# Patient Record
Sex: FEMALE | Race: WHITE | NOT HISPANIC OR LATINO | Employment: FULL TIME | URBAN - METROPOLITAN AREA
[De-identification: names, ages, dates, MRNs, and addresses within clinical notes are randomized per-mention and may not be internally consistent; named-entity substitution may affect disease eponyms.]

---

## 2021-08-15 ENCOUNTER — HOSPITAL ENCOUNTER (OUTPATIENT)
Facility: HOSPITAL | Age: 46
Setting detail: OBSERVATION
Discharge: HOME | End: 2021-08-17
Attending: EMERGENCY MEDICINE | Admitting: HOSPITALIST

## 2021-08-15 ENCOUNTER — APPOINTMENT (OUTPATIENT)
Dept: CT IMAGING | Facility: HOSPITAL | Age: 46
End: 2021-08-15

## 2021-08-15 DIAGNOSIS — F19.10 POLYSUBSTANCE ABUSE (HCC): ICD-10-CM

## 2021-08-15 DIAGNOSIS — E11.65 UNCONTROLLED TYPE 2 DIABETES MELLITUS WITH HYPERGLYCEMIA (HCC): ICD-10-CM

## 2021-08-15 DIAGNOSIS — B86 SCABIES: ICD-10-CM

## 2021-08-15 DIAGNOSIS — R41.82 ALTERED MENTAL STATUS, UNSPECIFIED ALTERED MENTAL STATUS TYPE: ICD-10-CM

## 2021-08-15 DIAGNOSIS — R40.20 LOSS OF CONSCIOUSNESS (HCC): Primary | ICD-10-CM

## 2021-08-15 PROBLEM — E11.9 TYPE 2 DIABETES MELLITUS (HCC): Status: ACTIVE | Noted: 2021-08-15

## 2021-08-15 PROBLEM — F19.90 ILLICIT DRUG USE: Status: ACTIVE | Noted: 2021-08-15

## 2021-08-15 PROBLEM — I10 ESSENTIAL HYPERTENSION: Status: ACTIVE | Noted: 2021-08-15

## 2021-08-15 PROBLEM — E66.01 OBESITY, CLASS III, BMI 40-49.9 (MORBID OBESITY) (HCC): Status: ACTIVE | Noted: 2021-08-15

## 2021-08-15 PROBLEM — Z87.898 HISTORY OF SEIZURES: Status: ACTIVE | Noted: 2021-08-15

## 2021-08-15 LAB
ALBUMIN SERPL-MCNC: 3.5 G/DL (ref 3.5–5.2)
ALBUMIN/GLOB SERPL: 0.9 G/DL
ALP SERPL-CCNC: 84 U/L (ref 39–117)
ALT SERPL W P-5'-P-CCNC: 38 U/L (ref 1–33)
AMPHET+METHAMPHET UR QL: POSITIVE
ANION GAP SERPL CALCULATED.3IONS-SCNC: 9.3 MMOL/L (ref 5–15)
ARTERIAL PATENCY WRIST A: POSITIVE
AST SERPL-CCNC: 29 U/L (ref 1–32)
ATMOSPHERIC PRESS: 753 MMHG
B-OH-BUTYR SERPL-SCNC: 0.09 MMOL/L (ref 0.02–0.27)
BACTERIA UR QL AUTO: ABNORMAL /HPF
BARBITURATES UR QL SCN: NEGATIVE
BASE EXCESS BLDA CALC-SCNC: 2.2 MMOL/L (ref 0–2)
BDY SITE: ABNORMAL
BENZODIAZ UR QL SCN: NEGATIVE
BILIRUB SERPL-MCNC: 0.3 MG/DL (ref 0–1.2)
BILIRUB UR QL STRIP: NEGATIVE
BUN SERPL-MCNC: 21 MG/DL (ref 6–20)
BUN/CREAT SERPL: 17.1 (ref 7–25)
CALCIUM SPEC-SCNC: 9.1 MG/DL (ref 8.6–10.5)
CANNABINOIDS SERPL QL: NEGATIVE
CHLORIDE SERPL-SCNC: 100 MMOL/L (ref 98–107)
CLARITY UR: CLEAR
CO2 SERPL-SCNC: 27.7 MMOL/L (ref 22–29)
COCAINE UR QL: NEGATIVE
COLOR UR: YELLOW
CREAT SERPL-MCNC: 1.23 MG/DL (ref 0.57–1)
DEPRECATED RDW RBC AUTO: 40.1 FL (ref 37–54)
ERYTHROCYTE [DISTWIDTH] IN BLOOD BY AUTOMATED COUNT: 12.8 % (ref 12.3–15.4)
ETHANOL BLD-MCNC: <10 MG/DL (ref 0–10)
ETHANOL UR QL: <0.01 %
GFR SERPL CREATININE-BSD FRML MDRD: 48 ML/MIN/1.73
GLOBULIN UR ELPH-MCNC: 3.7 GM/DL
GLUCOSE BLDC GLUCOMTR-MCNC: 211 MG/DL (ref 70–130)
GLUCOSE BLDC GLUCOMTR-MCNC: 254 MG/DL (ref 70–130)
GLUCOSE BLDC GLUCOMTR-MCNC: 294 MG/DL (ref 70–130)
GLUCOSE BLDC GLUCOMTR-MCNC: 307 MG/DL (ref 70–130)
GLUCOSE BLDC GLUCOMTR-MCNC: 345 MG/DL (ref 70–130)
GLUCOSE SERPL-MCNC: 354 MG/DL (ref 65–99)
GLUCOSE UR STRIP-MCNC: ABNORMAL MG/DL
HBA1C MFR BLD: 11.68 % (ref 4.8–5.6)
HCG SERPL QL: NEGATIVE
HCO3 BLDA-SCNC: 26.5 MMOL/L (ref 22–28)
HCT VFR BLD AUTO: 38.6 % (ref 34–46.6)
HGB BLD-MCNC: 12.6 G/DL (ref 12–15.9)
HGB UR QL STRIP.AUTO: ABNORMAL
HOLD SPECIMEN: NORMAL
HYALINE CASTS UR QL AUTO: ABNORMAL /LPF
KETONES UR QL STRIP: NEGATIVE
LEUKOCYTE ESTERASE UR QL STRIP.AUTO: NEGATIVE
LYMPHOCYTES # BLD MANUAL: 1.38 10*3/MM3 (ref 0.7–3.1)
LYMPHOCYTES NFR BLD MANUAL: 13 % (ref 19.6–45.3)
LYMPHOCYTES NFR BLD MANUAL: 2 % (ref 5–12)
MCH RBC QN AUTO: 28.4 PG (ref 26.6–33)
MCHC RBC AUTO-ENTMCNC: 32.6 G/DL (ref 31.5–35.7)
MCV RBC AUTO: 86.9 FL (ref 79–97)
METHADONE UR QL SCN: NEGATIVE
MODALITY: ABNORMAL
MONOCYTES # BLD AUTO: 0.21 10*3/MM3 (ref 0.1–0.9)
NEUTROPHILS # BLD AUTO: 9.04 10*3/MM3 (ref 1.7–7)
NEUTROPHILS NFR BLD MANUAL: 85 % (ref 42.7–76)
NITRITE UR QL STRIP: NEGATIVE
OPIATES UR QL: NEGATIVE
OXYCODONE UR QL SCN: POSITIVE
PCO2 BLDA: 39.1 MM HG (ref 35–45)
PH BLDA: 7.44 PH UNITS (ref 7.35–7.45)
PH UR STRIP.AUTO: 5.5 [PH] (ref 5–8)
PLAT MORPH BLD: NORMAL
PLATELET # BLD AUTO: 215 10*3/MM3 (ref 140–450)
PMV BLD AUTO: 10.3 FL (ref 6–12)
PO2 BLDA: 81.7 MM HG (ref 80–100)
POLYCHROMASIA BLD QL SMEAR: ABNORMAL
POTASSIUM SERPL-SCNC: 3.5 MMOL/L (ref 3.5–5.2)
PROT SERPL-MCNC: 7.2 G/DL (ref 6–8.5)
PROT UR QL STRIP: ABNORMAL
QT INTERVAL: 362 MS
RBC # BLD AUTO: 4.44 10*6/MM3 (ref 3.77–5.28)
RBC # UR: ABNORMAL /HPF
REF LAB TEST METHOD: ABNORMAL
SAO2 % BLDCOA: 96.4 % (ref 92–99)
SARS-COV-2 ORF1AB RESP QL NAA+PROBE: NOT DETECTED
SODIUM SERPL-SCNC: 137 MMOL/L (ref 136–145)
SP GR UR STRIP: >=1.03 (ref 1–1.03)
SQUAMOUS #/AREA URNS HPF: ABNORMAL /HPF
TOTAL RATE: 16 BREATHS/MINUTE
UROBILINOGEN UR QL STRIP: ABNORMAL
WBC # BLD AUTO: 10.63 10*3/MM3 (ref 3.4–10.8)
WBC MORPH BLD: NORMAL
WBC UR QL AUTO: ABNORMAL /HPF
WHOLE BLOOD HOLD SPECIMEN: NORMAL

## 2021-08-15 PROCEDURE — 85025 COMPLETE CBC W/AUTO DIFF WBC: CPT

## 2021-08-15 PROCEDURE — 82962 GLUCOSE BLOOD TEST: CPT

## 2021-08-15 PROCEDURE — 80307 DRUG TEST PRSMV CHEM ANLYZR: CPT | Performed by: PHYSICIAN ASSISTANT

## 2021-08-15 PROCEDURE — 85007 BL SMEAR W/DIFF WBC COUNT: CPT

## 2021-08-15 PROCEDURE — 36600 WITHDRAWAL OF ARTERIAL BLOOD: CPT

## 2021-08-15 PROCEDURE — 81001 URINALYSIS AUTO W/SCOPE: CPT

## 2021-08-15 PROCEDURE — 82010 KETONE BODYS QUAN: CPT

## 2021-08-15 PROCEDURE — 80053 COMPREHEN METABOLIC PANEL: CPT

## 2021-08-15 PROCEDURE — U0004 COV-19 TEST NON-CDC HGH THRU: HCPCS | Performed by: PHYSICIAN ASSISTANT

## 2021-08-15 PROCEDURE — 96360 HYDRATION IV INFUSION INIT: CPT

## 2021-08-15 PROCEDURE — G0378 HOSPITAL OBSERVATION PER HR: HCPCS

## 2021-08-15 PROCEDURE — 82077 ASSAY SPEC XCP UR&BREATH IA: CPT | Performed by: PHYSICIAN ASSISTANT

## 2021-08-15 PROCEDURE — C9803 HOPD COVID-19 SPEC COLLECT: HCPCS

## 2021-08-15 PROCEDURE — 82803 BLOOD GASES ANY COMBINATION: CPT

## 2021-08-15 PROCEDURE — 96372 THER/PROPH/DIAG INJ SC/IM: CPT

## 2021-08-15 PROCEDURE — 25010000002 IOPAMIDOL 61 % SOLUTION: Performed by: EMERGENCY MEDICINE

## 2021-08-15 PROCEDURE — 84703 CHORIONIC GONADOTROPIN ASSAY: CPT

## 2021-08-15 PROCEDURE — 93010 ELECTROCARDIOGRAM REPORT: CPT | Performed by: INTERNAL MEDICINE

## 2021-08-15 PROCEDURE — 70450 CT HEAD/BRAIN W/O DYE: CPT

## 2021-08-15 PROCEDURE — 83036 HEMOGLOBIN GLYCOSYLATED A1C: CPT | Performed by: NURSE PRACTITIONER

## 2021-08-15 PROCEDURE — 74177 CT ABD & PELVIS W/CONTRAST: CPT

## 2021-08-15 PROCEDURE — 99285 EMERGENCY DEPT VISIT HI MDM: CPT

## 2021-08-15 PROCEDURE — 25010000002 ENOXAPARIN PER 10 MG: Performed by: NURSE PRACTITIONER

## 2021-08-15 PROCEDURE — 93005 ELECTROCARDIOGRAM TRACING: CPT

## 2021-08-15 PROCEDURE — 96361 HYDRATE IV INFUSION ADD-ON: CPT

## 2021-08-15 RX ORDER — LOSARTAN POTASSIUM 25 MG/1
25 TABLET ORAL DAILY
COMMUNITY
Start: 2021-08-04 | End: 2022-08-04

## 2021-08-15 RX ORDER — SODIUM CHLORIDE 0.9 % (FLUSH) 0.9 %
10 SYRINGE (ML) INJECTION AS NEEDED
Status: DISCONTINUED | OUTPATIENT
Start: 2021-08-15 | End: 2021-08-17 | Stop reason: HOSPADM

## 2021-08-15 RX ORDER — LOSARTAN POTASSIUM 25 MG/1
25 TABLET ORAL DAILY
Status: DISCONTINUED | OUTPATIENT
Start: 2021-08-16 | End: 2021-08-17 | Stop reason: HOSPADM

## 2021-08-15 RX ORDER — ONDANSETRON 4 MG/1
4 TABLET, FILM COATED ORAL EVERY 6 HOURS PRN
Status: DISCONTINUED | OUTPATIENT
Start: 2021-08-15 | End: 2021-08-17 | Stop reason: HOSPADM

## 2021-08-15 RX ORDER — INSULIN LISPRO 100 [IU]/ML
0-9 INJECTION, SOLUTION INTRAVENOUS; SUBCUTANEOUS
Status: DISCONTINUED | OUTPATIENT
Start: 2021-08-15 | End: 2021-08-17 | Stop reason: HOSPADM

## 2021-08-15 RX ORDER — ATORVASTATIN CALCIUM 20 MG/1
20 TABLET, FILM COATED ORAL DAILY
Status: DISCONTINUED | OUTPATIENT
Start: 2021-08-16 | End: 2021-08-17 | Stop reason: HOSPADM

## 2021-08-15 RX ORDER — OXYBUTYNIN CHLORIDE 5 MG/1
5 TABLET ORAL DAILY
Status: DISCONTINUED | OUTPATIENT
Start: 2021-08-16 | End: 2021-08-17 | Stop reason: HOSPADM

## 2021-08-15 RX ORDER — HYDROXYZINE PAMOATE 50 MG/1
50 CAPSULE ORAL
COMMUNITY
Start: 2021-07-31

## 2021-08-15 RX ORDER — ACETAMINOPHEN 325 MG/1
650 TABLET ORAL EVERY 4 HOURS PRN
Status: DISCONTINUED | OUTPATIENT
Start: 2021-08-15 | End: 2021-08-17 | Stop reason: HOSPADM

## 2021-08-15 RX ORDER — NITROGLYCERIN 0.4 MG/1
0.4 TABLET SUBLINGUAL
Status: DISCONTINUED | OUTPATIENT
Start: 2021-08-15 | End: 2021-08-17 | Stop reason: HOSPADM

## 2021-08-15 RX ORDER — ACETAMINOPHEN 650 MG/1
650 SUPPOSITORY RECTAL EVERY 4 HOURS PRN
Status: DISCONTINUED | OUTPATIENT
Start: 2021-08-15 | End: 2021-08-17 | Stop reason: HOSPADM

## 2021-08-15 RX ORDER — DEXTROSE MONOHYDRATE 25 G/50ML
25 INJECTION, SOLUTION INTRAVENOUS
Status: DISCONTINUED | OUTPATIENT
Start: 2021-08-15 | End: 2021-08-17 | Stop reason: HOSPADM

## 2021-08-15 RX ORDER — INSULIN GLARGINE 100 [IU]/ML
10 INJECTION, SOLUTION SUBCUTANEOUS
COMMUNITY
Start: 2021-08-04

## 2021-08-15 RX ORDER — SODIUM CHLORIDE 9 MG/ML
100 INJECTION, SOLUTION INTRAVENOUS CONTINUOUS
Status: DISCONTINUED | OUTPATIENT
Start: 2021-08-15 | End: 2021-08-16

## 2021-08-15 RX ORDER — OXYBUTYNIN CHLORIDE 5 MG/1
5 TABLET ORAL DAILY
COMMUNITY
Start: 2021-08-04 | End: 2022-01-31

## 2021-08-15 RX ORDER — ATORVASTATIN CALCIUM 20 MG/1
20 TABLET, FILM COATED ORAL DAILY
COMMUNITY
Start: 2021-08-05 | End: 2022-02-01

## 2021-08-15 RX ORDER — ACETAMINOPHEN 160 MG/5ML
650 SOLUTION ORAL EVERY 4 HOURS PRN
Status: DISCONTINUED | OUTPATIENT
Start: 2021-08-15 | End: 2021-08-17 | Stop reason: HOSPADM

## 2021-08-15 RX ORDER — HYDROCHLOROTHIAZIDE 25 MG/1
25 TABLET ORAL DAILY
COMMUNITY
Start: 2021-08-04 | End: 2022-01-31

## 2021-08-15 RX ORDER — ONDANSETRON 2 MG/ML
4 INJECTION INTRAMUSCULAR; INTRAVENOUS EVERY 6 HOURS PRN
Status: DISCONTINUED | OUTPATIENT
Start: 2021-08-15 | End: 2021-08-17 | Stop reason: HOSPADM

## 2021-08-15 RX ORDER — NICOTINE POLACRILEX 4 MG
15 LOZENGE BUCCAL
Status: DISCONTINUED | OUTPATIENT
Start: 2021-08-15 | End: 2021-08-17 | Stop reason: HOSPADM

## 2021-08-15 RX ADMIN — ENOXAPARIN SODIUM 40 MG: 40 INJECTION SUBCUTANEOUS at 19:01

## 2021-08-15 RX ADMIN — IOPAMIDOL 85 ML: 612 INJECTION, SOLUTION INTRAVENOUS at 12:03

## 2021-08-15 RX ADMIN — SODIUM CHLORIDE 100 ML/HR: 9 INJECTION, SOLUTION INTRAVENOUS at 15:26

## 2021-08-15 NOTE — ED NOTES
Pt found passed out at work.  EMS states pt blood sugar was 329 upon their arrival.  Pt recently diagnosed with Diabetes.  Pt told EMS she does not know if she checked her blood sugar or took her diabetic medicines yesterday..  EMS 1mg Narcan IV and IVF's. Pt denies drug or alcohol use.   Pt also complained of R groin pain.  Denies any injuries  Mask placed on patient in triage.  Triage RN wearing mask throughout encounter.       Timothy Blake RN  08/15/21 0713       Timothy Blake RN  08/15/21 0715

## 2021-08-15 NOTE — NURSING NOTE
Access Center note.  Attempted to see patient regarding drug use, UDS positive for amph/meth and oxycodone.  Spoke with RN who reports patient is very groggy and she has had difficulty keeping patient awake to answer questions.  Upon entering room patient is sleeping soundly and she does not waken to name being called.    AC will return at a later time in hopes of completing evaluation.  RN informed.

## 2021-08-15 NOTE — ED PROVIDER NOTES
" EMERGENCY DEPARTMENT ENCOUNTER    Room Number:  E467/1  Date seen:  8/15/2021  Time seen: 09:32 EDT  PCP: Provider, No Known  Historian: patient, EMS      HPI:  Chief Complaint: found unconcious at work    A complete HPI/ROS/PMH/PSH/SH/FH are unobtainable due to: was unconscious and poor historian    Context: Annie Myers is a 45 y.o. female who presents to the ED for evaluation of found unconscious at work.  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  She does not remember if she was sitting in a chair or on the floor.  Cannot provide any additional details regarding the actual event event.  She does admit to taking pain pills, denied this to triage, cannot remember if she took any yesterday.  She denies alcohol use as well as any other drug use.  She denies having any chest pain shortness of breath nausea vomiting diarrhea urinary symptoms.  She does report right lower quadrant pain that she noted upon awaking from this episode.  Was only diagnosed with diabetes and started on insulin and Metformin, blood sugar on scene was in the 300s    EMS reports a triage nurse was that they were called for patient being \"passed out.\"  Coworkers found her on the floor, she was unconscious.  She was given Narcan with mild improvement of her mental status.  She did wake up upon their arrival.            PAST MEDICAL HISTORY  Active Ambulatory Problems     Diagnosis Date Noted   • No Active Ambulatory Problems     Resolved Ambulatory Problems     Diagnosis Date Noted   • No Resolved Ambulatory Problems     Past Medical History:   Diagnosis Date   • Diabetes mellitus (CMS/HCC)    • Hypertension    • Sleep apnea          PAST SURGICAL HISTORY  No past surgical history on file.      FAMILY HISTORY  No family history on file.      SOCIAL HISTORY  Social History     Socioeconomic History   • Marital status: Single     Spouse name: Not on file   • Number of " children: Not on file   • Years of education: Not on file   • Highest education level: Not on file         ALLERGIES  Patient has no known allergies.        REVIEW OF SYSTEMS  Review of Systems     All systems reviewed and negative except for those discussed in HPI.       PHYSICAL EXAM  ED Triage Vitals   Temp Heart Rate Resp BP SpO2   08/15/21 0713 08/15/21 0713 08/15/21 0713 08/15/21 0713 08/15/21 0725   98.7 °F (37.1 °C) 108 24 107/53 97 %      Temp src Heart Rate Source Patient Position BP Location FiO2 (%)   -- -- -- -- --                GENERAL: not distressed  HENT: atraumatic normocephalic  EYES: no scleral icterus  CV: regular rhythm, regular rate  RESPIRATORY: normal effort CTA B  ABDOMEN: Obese, soft, nondistended, mild tenderness in right lower quadrant, no guarding rigidity or CVA tenderness  MUSCULOSKELETAL: no deformity, no C, T, L-spine tenderness.  Extremities are atraumatic in appearance, nontender with full range of motion  NEURO: Asleep upon my entering the room, awakens easily to verbal stimulation, becomes alert, moves all extremities, follows commands, no focal neurologic deficits  SKIN: warm, dry.  Excoriated punctate rash diffusely to the trunk and extremities, some areas appear scaled/lichenified    Vital signs and nursing notes reviewed.          LAB RESULTS  Recent Results (from the past 24 hour(s))   POC Glucose Once    Collection Time: 08/15/21  7:23 AM    Specimen: Blood   Result Value Ref Range    Glucose 345 (H) 70 - 130 mg/dL   Comprehensive Metabolic Panel    Collection Time: 08/15/21  7:37 AM    Specimen: Arm, Left; Blood   Result Value Ref Range    Glucose 354 (H) 65 - 99 mg/dL    BUN 21 (H) 6 - 20 mg/dL    Creatinine 1.23 (H) 0.57 - 1.00 mg/dL    Sodium 137 136 - 145 mmol/L    Potassium 3.5 3.5 - 5.2 mmol/L    Chloride 100 98 - 107 mmol/L    CO2 27.7 22.0 - 29.0 mmol/L    Calcium 9.1 8.6 - 10.5 mg/dL    Total Protein 7.2 6.0 - 8.5 g/dL    Albumin 3.50 3.50 - 5.20 g/dL    ALT  (SGPT) 38 (H) 1 - 33 U/L    AST (SGOT) 29 1 - 32 U/L    Alkaline Phosphatase 84 39 - 117 U/L    Total Bilirubin 0.3 0.0 - 1.2 mg/dL    eGFR Non African Amer 48 (L) >60 mL/min/1.73    Globulin 3.7 gm/dL    A/G Ratio 0.9 g/dL    BUN/Creatinine Ratio 17.1 7.0 - 25.0    Anion Gap 9.3 5.0 - 15.0 mmol/L   hCG, Serum, Qualitative    Collection Time: 08/15/21  7:37 AM    Specimen: Arm, Left; Blood   Result Value Ref Range    HCG Qualitative Negative Negative   Green Top (Gel)    Collection Time: 08/15/21  7:37 AM   Result Value Ref Range    Extra Tube Hold for add-ons.    Lavender Top    Collection Time: 08/15/21  7:37 AM   Result Value Ref Range    Extra Tube hold for add-on    CBC Auto Differential    Collection Time: 08/15/21  7:37 AM    Specimen: Arm, Left; Blood   Result Value Ref Range    WBC 10.63 3.40 - 10.80 10*3/mm3    RBC 4.44 3.77 - 5.28 10*6/mm3    Hemoglobin 12.6 12.0 - 15.9 g/dL    Hematocrit 38.6 34.0 - 46.6 %    MCV 86.9 79.0 - 97.0 fL    MCH 28.4 26.6 - 33.0 pg    MCHC 32.6 31.5 - 35.7 g/dL    RDW 12.8 12.3 - 15.4 %    RDW-SD 40.1 37.0 - 54.0 fl    MPV 10.3 6.0 - 12.0 fL    Platelets 215 140 - 450 10*3/mm3   Beta Hydroxybutyrate Quantitative    Collection Time: 08/15/21  7:37 AM    Specimen: Arm, Left; Blood   Result Value Ref Range    Beta-Hydroxybutyrate Quant 0.089 0.020 - 0.270 mmol/L   Manual Differential    Collection Time: 08/15/21  7:37 AM    Specimen: Arm, Left; Blood   Result Value Ref Range    Neutrophil % 85.0 (H) 42.7 - 76.0 %    Lymphocyte % 13.0 (L) 19.6 - 45.3 %    Monocyte % 2.0 (L) 5.0 - 12.0 %    Neutrophils Absolute 9.04 (H) 1.70 - 7.00 10*3/mm3    Lymphocytes Absolute 1.38 0.70 - 3.10 10*3/mm3    Monocytes Absolute 0.21 0.10 - 0.90 10*3/mm3    Polychromasia Slight/1+ None Seen    WBC Morphology Normal Normal    Platelet Morphology Normal Normal   Ethanol    Collection Time: 08/15/21  7:37 AM    Specimen: Arm, Left; Blood   Result Value Ref Range    Ethanol <10 0 - 10 mg/dL    Ethanol %  <0.010 %   Hemoglobin A1c    Collection Time: 08/15/21  7:37 AM    Specimen: Arm, Left; Blood   Result Value Ref Range    Hemoglobin A1C 11.68 (H) 4.80 - 5.60 %   ECG 12 Lead    Collection Time: 08/15/21  9:37 AM   Result Value Ref Range    QT Interval 362 ms   POC Glucose Once    Collection Time: 08/15/21  9:49 AM    Specimen: Blood   Result Value Ref Range    Glucose 307 (H) 70 - 130 mg/dL   Urinalysis With Microscopic If Indicated (No Culture) - Urine, Clean Catch    Collection Time: 08/15/21 10:21 AM    Specimen: Urine, Clean Catch   Result Value Ref Range    Color, UA Yellow Yellow, Straw    Appearance, UA Clear Clear    pH, UA 5.5 5.0 - 8.0    Specific Gravity, UA >=1.030 1.005 - 1.030    Glucose, UA >=1000 mg/dL (3+) (A) Negative    Ketones, UA Negative Negative    Bilirubin, UA Negative Negative    Blood, UA Large (3+) (A) Negative    Protein, UA 30 mg/dL (1+) (A) Negative    Leuk Esterase, UA Negative Negative    Nitrite, UA Negative Negative    Urobilinogen, UA 0.2 E.U./dL 0.2 - 1.0 E.U./dL   Urine Drug Screen - Urine, Clean Catch    Collection Time: 08/15/21 10:21 AM    Specimen: Urine, Clean Catch   Result Value Ref Range    Amphet/Methamphet, Screen Positive (A) Negative    Barbiturates Screen, Urine Negative Negative    Benzodiazepine Screen, Urine Negative Negative    Cocaine Screen, Urine Negative Negative    Opiate Screen Negative Negative    THC, Screen, Urine Negative Negative    Methadone Screen, Urine Negative Negative    Oxycodone Screen, Urine Positive (A) Negative   Urinalysis, Microscopic Only - Urine, Clean Catch    Collection Time: 08/15/21 10:21 AM    Specimen: Urine, Clean Catch   Result Value Ref Range    RBC, UA 3-5 (A) None Seen, 0-2 /HPF    WBC, UA 3-5 (A) None Seen, 0-2 /HPF    Bacteria, UA 1+ (A) None Seen /HPF    Squamous Epithelial Cells, UA 3-6 (A) None Seen, 0-2 /HPF    Hyaline Casts, UA 0-2 None Seen /LPF    Methodology Manual Light Microscopy    Blood Gas, Arterial -     Collection Time: 08/15/21 12:17 PM    Specimen: Arterial Blood   Result Value Ref Range    Site Arterial: left radial     Nitin's Test Positive     pH, Arterial 7.439 7.350 - 7.450 pH units    pCO2, Arterial 39.1 35.0 - 45.0 mm Hg    pO2, Arterial 81.7 80.0 - 100.0 mm Hg    HCO3, Arterial 26.5 22.0 - 28.0 mmol/L    Base Excess, Arterial 2.2 (H) 0.0 - 2.0 mmol/L    O2 Saturation Calculated 96.4 92.0 - 99.0 %    Barometric Pressure for Blood Gas 753.0 mmHg    Modality Room Air     Rate 16 Breaths/minute   POC Glucose Once    Collection Time: 08/15/21 12:51 PM    Specimen: Blood   Result Value Ref Range    Glucose 294 (H) 70 - 130 mg/dL       Ordered the above labs and independently reviewed the results.        RADIOLOGY  CT Abdomen Pelvis With Contrast   Final Result   1.There is evidence of diffuse hepatic steatosis.   2. There is moderate retention of stool throughout the colon.   3. There are areas of focal atelectasis and/or scarring at the base of   the right middle lobe and lingula. In the appropriate clinical context,   these areas could represent focal infiltrates at the bases of the right   middle lobe and lingula. Clinical correlation is recommended.       This report was finalized on 8/15/2021 2:42 PM by Dr. Peña Rosales M.D.          CT Head Without Contrast   Final Result       No acute intracranial hemorrhage or hydrocephalus. If there is further   clinical concern, MRI could be considered for further evaluation.       This report was finalized on 8/15/2021 12:15 PM by Dr. Giovani Gaitan M.D.              I ordered the above noted radiological studies. Reviewed by me and discussed with radiologist.  See dictation for official radiology interpretation.    PROCEDURES  Procedures        MEDICATIONS GIVEN IN ER  Medications   sodium chloride 0.9 % flush 10 mL (has no administration in time range)   nitroglycerin (NITROSTAT) SL tablet 0.4 mg (has no administration in time range)   acetaminophen  (TYLENOL) tablet 650 mg (has no administration in time range)     Or   acetaminophen (TYLENOL) 160 MG/5ML solution 650 mg (has no administration in time range)     Or   acetaminophen (TYLENOL) suppository 650 mg (has no administration in time range)   ondansetron (ZOFRAN) tablet 4 mg (has no administration in time range)     Or   ondansetron (ZOFRAN) injection 4 mg (has no administration in time range)   Pharmacy to Dose enoxaparin (LOVENOX) (has no administration in time range)   dextrose (GLUTOSE) oral gel 15 g (has no administration in time range)   dextrose (D50W) 25 g/ 50mL Intravenous Solution 25 g (has no administration in time range)   glucagon (human recombinant) (GLUCAGEN DIAGNOSTIC) injection 1 mg (has no administration in time range)   insulin lispro (ADMELOG) injection 0-9 Units (has no administration in time range)   sodium chloride 0.9 % infusion (100 mL/hr Intravenous New Bag 8/15/21 1526)   iopamidol (ISOVUE-300) 61 % injection 100 mL (85 mL Intravenous Given by Other 8/15/21 1203)             PROGRESS AND CONSULTS    DDX includes but not limited to arrhythmia, anemia, hypoglycemia, orthostasis, vasovagal syncope, dehydration, substance abuse/overdose, seizure    ED Course as of Aug 15 1537   Sun Aug 15, 2021   0949 WBC: 10.63 [KA]   0949 Hemoglobin: 12.6 [KA]   0949 Glucose(!): 354 [KA]   0949 BUN(!): 21 [KA]   0949 Creatinine(!): 1.23 [KA]   0949 Glucose(!): 345 [KA]   0949 HCG Qualitative: Negative [KA]   0949 Beta-Hydroxybutyrate Quant: 0.089 [KA]   1000 Medical chart reviewed.  Office visit 8/4/2021 with [facility] physicians family medicine.  Notably patient has history of CAD CHF hypertension prediabetes, first visit in several years.  Blood pressure and A1c was elevated, chronic edema to bilateral lower extremities, given hydroxyzine and triamcinolone at urgent care just prior to that office visit.    [KA]   1119 Amphet/Methamphet, Screen(!): Positive [KA]   1119 EKG was done  at 937Is a rate of 94It is sinus rhythmThere is interventricular conduction delayI see some nonspecific changes but do not see an acute injury patternQT looks normal.    [MM]   1119 Oxycodone Screen, Urine(!): Positive [MM]   1119 Amphet/Methamphet, Screen(!): Positive [MM]   1120 Oxycodone Screen, Urine(!): Positive [KA]   1215 I discussed the patient with Dr. Rosales, radiologist.  He states CT abdomen and pelvis shows fatty liver disease, normal appendix, moderate stool in the colon and atelectasis versus infiltrate in the bases of the lungs, favored to be atelectasis.  No acute intra-abdominal findings.    [KA]   1233 pCO2, Arterial: 39.1 [KA]   1300 I discussed the patient with BARBARA Pizano for [facility].  She agrees to admit the patient to telemetry on behalf of Dr. Rosa.    [KA]      ED Course User Index  [KA] Tari Shipman PA  [MM] Williams Duggan MD             Patient was placed in face mask in first look. Patient was wearing facemask each time I entered the room and throughout our encounter. I wore protective equipment throughout this patient encounter including a face mask, eye shield and gloves. Hand hygiene was performed before donning protective equipment and after removal when leaving the room.        DIAGNOSIS  Final diagnoses:   Loss of consciousness (CMS/HCC)   Polysubstance abuse (CMS/HCC)   Altered mental status, unspecified altered mental status type   Scabies   Uncontrolled type 2 diabetes mellitus with hyperglycemia (CMS/HCC)           Latest Documented Vital Signs:  As of 15:37 EDT  BP- 109/75 HR- 78 Temp- 98.7 °F (37.1 °C) O2 sat- 98%       Tari Shipman PA  08/15/21 1537

## 2021-08-15 NOTE — PROGRESS NOTES
Pharmacy consult for enoxaparin dosing    Annie Myers is a 45 y.o. female (!) 150 kg (330 lb).  BMI=46  Pharmacy consulted to dose for VTE prophylaxis per BARBARA Pacheco's request.    Estimated Creatinine Clearance: 95 mL/min (A) (by C-G formula based on SCr of 1.23 mg/dL (H)).  Creatinine   Date Value Ref Range Status   08/15/2021 1.23 (H) 0.57 - 1.00 mg/dL Final     Platelets   Date Value Ref Range Status   08/15/2021 215 140 - 450 10*3/mm3 Final     Lab Results   Component Value Date    HGB 12.6 08/15/2021     No results found for: INR    Assessment/ Plan:  Enoxaparin dosed at 40mg SQ q12h for VTE prophylaxis in patient with Crcl>30 and BMI>40. Pharmacy will continue to monitor and adjust as needed until discontinued.    Ailze Carey, PharmD, BCPS  08/15/21 15:38 EDT
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
## 2021-08-15 NOTE — H&P
Patient Name:  Annie Myers  YOB: 1975  MRN:  [id number]  Admit Date:  8/15/2021  Patient Care Team:  Provider, No Known as PCP - General      Subjective   History Present Illness     Chief Complaint   Patient presents with   • Syncope   • Hyperglycemia       Ms. Myers is a 45 y.o. female with a history of newly diagnosed DM, obesity, HTN, previous seizures not on AED that presents to [facility] complaining of being found unresponsive at work. She does not recall a lot of the details but reports she was in a normal state of health prior to going to work last night. 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 She has been recently diagnosed as diabetic otherwise no recent illnesses. She received Narcan by EMS with reported minimal response. Denies fever, HA, cough, chest pain, shortness of breath, n/v/d. She has a rash on her neck and upper chest that she says started after she wore a necklace. She also has scattered scabbed lesions, similar to insect bites on her arms, legs, and abdomen, and a scaly patch on her elbows but she is unclear of when these developed.    Afebrile. HR controlled. BP acceptable. On room air. Ethanol neg. WBC 10.63, Hgb 12.6. HCG qual neg. Gluc 354, BUN/Cr 21/1.23, ALT 38; remaining chem panel unremarkable. UA negative for infection; 3+ gluc, 3+ blood, 1+ protein. UDS positive oxycodone & methamphetamine. CTH neg. CT A/P diffuse hepatic steatosis, mod stool, atelectasis and/or scarring RML & lingula      Review of Systems   Constitutional: Positive for fatigue. Negative for fever.   HENT: Negative for sore throat.         Dry mouth   Respiratory: Negative for cough and shortness of breath.    Cardiovascular: Negative for chest pain.   Gastrointestinal: Negative for diarrhea, nausea and vomiting.   Genitourinary: Negative for dysuria.   Musculoskeletal: Negative for arthralgias and myalgias.   Skin:  Positive for rash.   Neurological: Negative for headaches.   Psychiatric/Behavioral: Negative for behavioral problems.        Personal History     Past Medical History:   Diagnosis Date   • Diabetes mellitus (CMS/HCC)    • Hypertension    • Sleep apnea      No past surgical history on file.  No family history on file.  Social History     Tobacco Use   • Smoking status: Not on file   Substance Use Topics   • Alcohol use: Not on file   • Drug use: Not on file     No current facility-administered medications on file prior to encounter.     No current outpatient medications on file prior to encounter.     No Known Allergies    Objective    Objective     Vital Signs  Temp:  [98.7 °F (37.1 °C)] 98.7 °F (37.1 °C)  Heart Rate:  [] 85  Resp:  [20-24] 20  BP: (103-112)/(53-76) 111/69  SpO2:  [93 %-98 %] 95 %  on   ;   Device (Oxygen Therapy): room air  Body mass index is 46.03 kg/m².    Physical Exam  Vitals and nursing note reviewed.   Constitutional:       General: She is not in acute distress.     Appearance: She is obese.      Comments: Drowsy   HENT:      Head: Normocephalic.      Mouth/Throat:      Comments: Mask worn  Eyes:      Conjunctiva/sclera: Conjunctivae normal.   Cardiovascular:      Rate and Rhythm: Normal rate and regular rhythm.   Pulmonary:      Effort: Pulmonary effort is normal. No respiratory distress.      Breath sounds: Normal breath sounds.   Abdominal:      General: Bowel sounds are normal.      Palpations: Abdomen is soft.   Musculoskeletal:      Cervical back: Neck supple.   Skin:     General: Skin is warm and dry.      Comments: Rash neck and upper chest  Scattered scabbed lesions over BUE, abd, BLE  Scaly patch on elbow   Neurological:      Comments: Drowsy but awakens  No focal deficits   Psychiatric:         Mood and Affect: Mood is not anxious.         Behavior: Behavior is not agitated. Behavior is cooperative.         Results Review:  I reviewed the patient's new clinical results.  I  reviewed the patient's new imaging results and agree with the interpretation.  I reviewed the patient's other test results and agree with the interpretation  I personally viewed and interpreted the patient's EKG/Telemetry data  Discussed with ED provider.    Lab Results (last 24 hours)     Procedure Component Value Units Date/Time    POC Glucose Once [[id number]]  (Abnormal) Collected: 08/15/21 0723    Specimen: Blood Updated: 08/15/21 0725     Glucose 345 mg/dL      Comment: Meter: [id number] : [id number] Hayden Aguirre RN       CBC & Differential [[id number]]  (Normal) Collected: 08/15/21 0737    Specimen: Blood from Arm, Left Updated: 08/15/21 0807    Narrative:      The following orders were created for panel order CBC & Differential.  Procedure                               Abnormality         Status                     ---------                               -----------         ------                     CBC Auto Differential[[id number]]        Normal              Final result                 Please view results for these tests on the individual orders.    Comprehensive Metabolic Panel [[id number]]  (Abnormal) Collected: 08/15/21 0737    Specimen: Blood from Arm, Left Updated: 08/15/21 0800     Glucose 354 mg/dL      BUN 21 mg/dL      Creatinine 1.23 mg/dL      Sodium 137 mmol/L      Potassium 3.5 mmol/L      Chloride 100 mmol/L      CO2 27.7 mmol/L      Calcium 9.1 mg/dL      Total Protein 7.2 g/dL      Albumin 3.50 g/dL      ALT (SGPT) 38 U/L      AST (SGOT) 29 U/L      Alkaline Phosphatase 84 U/L      Total Bilirubin 0.3 mg/dL      eGFR Non African Amer 48 mL/min/1.73      Globulin 3.7 gm/dL      A/G Ratio 0.9 g/dL      BUN/Creatinine Ratio 17.1     Anion Gap 9.3 mmol/L     Narrative:      GFR Normal >60  Chronic Kidney Disease <60  Kidney Failure <15      Ketone Bodies, Serum (Not performed at [place]) [[id number]]  (Normal) Collected: 08/15/21 0737    Specimen: Blood from Arm, Left Updated: 08/15/21 0800     Narrative:      The following orders were created for panel order Ketone Bodies, Serum (Not performed at [place]).  Procedure                               Abnormality         Status                     ---------                               -----------         ------                     Beta Hydroxybutyrate Shaheed...[[id number]]  Normal              Final result                 Please view results for these tests on the individual orders.    hCG, Serum, Qualitative [[id number]]  (Normal) Collected: 08/15/21 0737    Specimen: Blood from Arm, Left Updated: 08/15/21 0755     HCG Qualitative Negative    CBC Auto Differential [[id number]]  (Normal) Collected: 08/15/21 0737    Specimen: Blood from Arm, Left Updated: 08/15/21 0807     WBC 10.63 10*3/mm3      RBC 4.44 10*6/mm3      Hemoglobin 12.6 g/dL      Hematocrit 38.6 %      MCV 86.9 fL      MCH 28.4 pg      MCHC 32.6 g/dL      RDW 12.8 %      RDW-SD 40.1 fl      MPV 10.3 fL      Platelets 215 10*3/mm3     Beta Hydroxybutyrate Quantitative [[id number]]  (Normal) Collected: 08/15/21 0737    Specimen: Blood from Arm, Left Updated: 08/15/21 0800     Beta-Hydroxybutyrate Quant 0.089 mmol/L     Narrative:      In the assessment of possible diabetic ketoacidosis, the test should be interpreted along with other clinical and laboratory findings.  A level greater than 1 mmol/L should require further evaluation and levels of more than 3 mmol/L require immediate medical review.    Manual Differential [[id number]]  (Abnormal) Collected: 08/15/21 0737    Specimen: Blood from Arm, Left Updated: 08/15/21 0817     Neutrophil % 85.0 %      Lymphocyte % 13.0 %      Monocyte % 2.0 %      Neutrophils Absolute 9.04 10*3/mm3      Lymphocytes Absolute 1.38 10*3/mm3      Monocytes Absolute 0.21 10*3/mm3      Polychromasia Slight/1+     WBC Morphology Normal     Platelet Morphology Normal    Ethanol [[id number]] Collected: 08/15/21 0737    Specimen: Blood from Arm, Left Updated: 08/15/21 1026      Ethanol <10 mg/dL      Ethanol % <0.010 %     Hemoglobin A1c [[id number]] Collected: 08/15/21 0737    Specimen: Blood from Arm, Left Updated: 08/15/21 1448    POC Glucose Once [[id number]]  (Abnormal) Collected: 08/15/21 0949    Specimen: Blood Updated: 08/15/21 0956     Glucose 307 mg/dL      Comment: Meter: [id number] : [id number] Billmarcelino MACKENZIE       Urinalysis With Microscopic If Indicated (No Culture) - Urine, Clean Catch [[id number]]  (Abnormal) Collected: 08/15/21 1021    Specimen: Urine, Clean Catch Updated: 08/15/21 1040     Color, UA Yellow     Appearance, UA Clear     pH, UA 5.5     Specific Gravity, UA >=1.030     Glucose, UA >=1000 mg/dL (3+)     Ketones, UA Negative     Bilirubin, UA Negative     Blood, UA Large (3+)     Protein, UA 30 mg/dL (1+)     Leuk Esterase, UA Negative     Nitrite, UA Negative     Urobilinogen, UA 0.2 E.U./dL    Urine Drug Screen - Urine, Clean Catch [[id number]]  (Abnormal) Collected: 08/15/21 1021    Specimen: Urine, Clean Catch Updated: 08/15/21 1111     Amphet/Methamphet, Screen Positive     Barbiturates Screen, Urine Negative     Benzodiazepine Screen, Urine Negative     Cocaine Screen, Urine Negative     Opiate Screen Negative     THC, Screen, Urine Negative     Methadone Screen, Urine Negative     Oxycodone Screen, Urine Positive    Narrative:      Negative Thresholds Per Drugs Screened:    Amphetamines                 500 ng/ml  Barbiturates                 200 ng/ml  Benzodiazepines              100 ng/ml  Cocaine                      300 ng/ml  Methadone                    300 ng/ml  Opiates                      300 ng/ml  Oxycodone                    100 ng/ml  THC                           50 ng/ml    The Normal Value for all drugs tested is negative. This report includes final unconfirmed screening results to be used for medical treatment purposes only. Unconfirmed results must not be used for non-medical purposes such as employment or legal testing. Clinical  consideration should be applied to any drug of abuse test, particularly when unconfirmed results are used.            Urinalysis, Microscopic Only - Urine, Clean Catch [[id number]]  (Abnormal) Collected: 08/15/21 1021    Specimen: Urine, Clean Catch Updated: 08/15/21 1120     RBC, UA 3-5 /HPF      WBC, UA 3-5 /HPF      Bacteria, UA 1+ /HPF      Squamous Epithelial Cells, UA 3-6 /HPF      Hyaline Casts, UA 0-2 /LPF      Methodology Manual Light Microscopy    Blood Gas, Arterial - [[id number]]  (Abnormal) Collected: 08/15/21 1217    Specimen: Arterial Blood Updated: 08/15/21 1221     Site Arterial: left radial     Nitin's Test Positive     pH, Arterial 7.439 pH units      pCO2, Arterial 39.1 mm Hg      pO2, Arterial 81.7 mm Hg      HCO3, Arterial 26.5 mmol/L      Base Excess, Arterial 2.2 mmol/L      O2 Saturation Calculated 96.4 %      Barometric Pressure for Blood Gas 753.0 mmHg      Comment: sats 96 Meter: [id number] : [id number] Familia Aguayo        Modality Room Air     Rate 16 Breaths/minute     POC Glucose Once [[id number]]  (Abnormal) Collected: 08/15/21 1251    Specimen: Blood Updated: 08/15/21 1254     Glucose 294 mg/dL      Comment: Meter: [id number] : [id number] Iris MACKENZIE       COVID PRE-OP / PRE-PROCEDURE SCREENING ORDER (NO ISOLATION) - Swab, Nasopharynx [[id number]] Collected: 08/15/21 1303    Specimen: Swab from Nasopharynx Updated: 08/15/21 1325    Narrative:      The following orders were created for panel order COVID PRE-OP / PRE-PROCEDURE SCREENING ORDER (NO ISOLATION) - Swab, Nasopharynx.  Procedure                               Abnormality         Status                     ---------                               -----------         ------                     COVID-19,APTIMA PANTHER(...[[id number]]                      In process                   Please view results for these tests on the individual orders.    COVID-19,APTIMA PANTHER(DWAINE), ELOISE, NP/OP SWAB IN UTM/VTM/SALINE  TRANSPORT MEDIA,24 HR TAT - Swab, Nasopharynx [[id number]] Collected: 08/15/21 1303    Specimen: Swab from Nasopharynx Updated: 08/15/21 1325          Imaging Results (Last 24 Hours)     Procedure Component Value Units Date/Time    CT Abdomen Pelvis With Contrast [[id number]] Collected: 08/15/21 1217     Updated: 08/15/21 1445    Narrative:      EXAMINATION: CT OF THE ABDOMEN AND PELVIS WITH CONTRAST     HISTORY: 45-year-old female with a history of right lower quadrant pain.        TECHNIQUE: Contiguous axial images were obtained through the abdomen and  pelvis following intravenous administration of nonionic contrast. Oral  contrast  was not administered. Radiation dose reduction techniques were  utilized, including automated exposure control and exposure modulation  based on body size.     COMPARISON: None     FINDINGS: There is focal opacification in the subpleural region of the  right middle lobe and at the base of the lingula. The visualized portion  of the heart has a normal appearance . The liver demonstrates a diffuse  hypodense appearance throughout without evidence for a focal  abnormality. The patient is status post cholecystectomy. The pancreas  appears normal. The kidneys have normal appearance. The adrenal glands  appear normal. The spleen has a normal appearance. There is moderate  retention of stool throughout the colon. A normal appendix is  visualized. There is evidence of prior bilateral tubal ligation  procedure. Otherwise, the uterus and adnexal structures have a grossly  normal CT appearance. The osseous structures of the lumbar spine and  pelvis appear normal.       Impression:      1.There is evidence of diffuse hepatic steatosis.  2. There is moderate retention of stool throughout the colon.  3. There are areas of focal atelectasis and/or scarring at the base of  the right middle lobe and lingula. In the appropriate clinical context,  these areas could represent focal infiltrates at the bases  of the right  middle lobe and lingula. Clinical correlation is recommended.     This report was finalized on 8/15/2021 2:42 PM by Dr. Peña Rosales M.D.       CT Head Without Contrast [[id number]] Collected: 08/15/21 1205     Updated: 08/15/21 1219    Narrative:      CT HEAD WO CONTRAST-     INDICATIONS: Change in mental status     TECHNIQUE: Radiation dose reduction techniques were utilized, including  automated exposure control and exposure modulation based on body size.  Noncontrast head CT     COMPARISON: None available     FINDINGS:           No acute intracranial hemorrhage, midline shift or mass effect. No acute  territorial infarct is identified.     Ventricles, cisterns, cerebral sulci are unremarkable for patient age.     The visualized paranasal sinuses, orbits, mastoid air cells are  unremarkable. A partly included circumscribed subcutaneous density  posterior inferior to the left external ear measuring 1.3 cm on image 1  could be a sebaceous cyst, correlate with clinical exam.                   Impression:         No acute intracranial hemorrhage or hydrocephalus. If there is further  clinical concern, MRI could be considered for further evaluation.     This report was finalized on 8/15/2021 12:15 PM by Dr. Giovani Gaitan M.D.                 ECG 12 Lead   Preliminary Result   HEART RATE= 94  bpm   RR Interval= 636  ms   CA Interval= 141  ms   P Horizontal Axis= 8  deg   P Front Axis= 65  deg   QRSD Interval= 118  ms   QT Interval= 362  ms   QRS Axis= -6  deg   T Wave Axis= 60  deg   - ABNORMAL ECG -   Sinus rhythm   Nonspecific intraventricular conduction delay   Electronically Signed By:    Date and Time of Study: 2021-08-15 09:37:58           Assessment/Plan     Active Hospital Problems    Diagnosis  POA   • **Loss of consciousness (CMS/HCC) [R40.20]  Yes   • Obesity, Class III, BMI 40-49.9 (morbid obesity) (CMS/HCC) [E66.01]  Yes   • Type 2 diabetes mellitus (CMS/HCC) [E11.9]  Yes   • Illicit  drug use [F19.90]  Yes   • Essential hypertension [I10]  Yes   • History of seizures [Z87.898]  Not Applicable      Resolved Hospital Problems   No resolved problems to display.       Ms. Myers is a 45 y.o. female with a history of newly diagnosed DM, obesity, HTN, previous seizure not on AED who is admitted for LOC, found unresponsive at work, illicit drug use    -CTH negative. Consult Neurology  -CT A/P performed due to c/o RLQ pain to ED provider; results as above. Mildly elevated ALT; will trend. Add bowel meds. No evidence of pneumonia on exam; encourage IS  -+UDS, ask Access to follow  -Monitor BG trends. Check A1C. Correctional scale insulin  -Allow diet when more awake  -BMI 46, complicates all aspects of care    Home meds not available    · I discussed the patient's findings and my recommendations with patient, ED provider and Dr. Rosa.    VTE Prophylaxis - Pharmacy to dose Lovenox.  Code Status - Full code.       BARBARA Beal  [facility]  08/15/21  15:08 EDT

## 2021-08-16 LAB
ANION GAP SERPL CALCULATED.3IONS-SCNC: 11.5 MMOL/L (ref 5–15)
BUN SERPL-MCNC: 16 MG/DL (ref 6–20)
BUN/CREAT SERPL: 22.2 (ref 7–25)
CALCIUM SPEC-SCNC: 8.4 MG/DL (ref 8.6–10.5)
CHLORIDE SERPL-SCNC: 102 MMOL/L (ref 98–107)
CO2 SERPL-SCNC: 23.5 MMOL/L (ref 22–29)
CREAT SERPL-MCNC: 0.72 MG/DL (ref 0.57–1)
DEPRECATED RDW RBC AUTO: 43.1 FL (ref 37–54)
ERYTHROCYTE [DISTWIDTH] IN BLOOD BY AUTOMATED COUNT: 13.4 % (ref 12.3–15.4)
GFR SERPL CREATININE-BSD FRML MDRD: 88 ML/MIN/1.73
GLUCOSE BLDC GLUCOMTR-MCNC: 233 MG/DL (ref 70–130)
GLUCOSE BLDC GLUCOMTR-MCNC: 246 MG/DL (ref 70–130)
GLUCOSE BLDC GLUCOMTR-MCNC: 254 MG/DL (ref 70–130)
GLUCOSE BLDC GLUCOMTR-MCNC: 292 MG/DL (ref 70–130)
GLUCOSE SERPL-MCNC: 263 MG/DL (ref 65–99)
HCT VFR BLD AUTO: 40.9 % (ref 34–46.6)
HGB BLD-MCNC: 13.7 G/DL (ref 12–15.9)
MCH RBC QN AUTO: 29.2 PG (ref 26.6–33)
MCHC RBC AUTO-ENTMCNC: 33.5 G/DL (ref 31.5–35.7)
MCV RBC AUTO: 87.2 FL (ref 79–97)
PLATELET # BLD AUTO: 204 10*3/MM3 (ref 140–450)
PMV BLD AUTO: 10.6 FL (ref 6–12)
POTASSIUM SERPL-SCNC: 3.5 MMOL/L (ref 3.5–5.2)
RBC # BLD AUTO: 4.69 10*6/MM3 (ref 3.77–5.28)
SODIUM SERPL-SCNC: 137 MMOL/L (ref 136–145)
WBC # BLD AUTO: 9.52 10*3/MM3 (ref 3.4–10.8)

## 2021-08-16 PROCEDURE — 96361 HYDRATE IV INFUSION ADD-ON: CPT

## 2021-08-16 PROCEDURE — 96372 THER/PROPH/DIAG INJ SC/IM: CPT

## 2021-08-16 PROCEDURE — 36415 COLL VENOUS BLD VENIPUNCTURE: CPT | Performed by: NURSE PRACTITIONER

## 2021-08-16 PROCEDURE — G0378 HOSPITAL OBSERVATION PER HR: HCPCS

## 2021-08-16 PROCEDURE — 82962 GLUCOSE BLOOD TEST: CPT

## 2021-08-16 PROCEDURE — 99203 OFFICE O/P NEW LOW 30 MIN: CPT | Performed by: PSYCHIATRY & NEUROLOGY

## 2021-08-16 PROCEDURE — 25010000002 ENOXAPARIN PER 10 MG: Performed by: NURSE PRACTITIONER

## 2021-08-16 PROCEDURE — 80048 BASIC METABOLIC PNL TOTAL CA: CPT | Performed by: NURSE PRACTITIONER

## 2021-08-16 PROCEDURE — 97162 PT EVAL MOD COMPLEX 30 MIN: CPT

## 2021-08-16 PROCEDURE — 63710000001 INSULIN GLARGINE PER 5 UNITS: Performed by: NURSE PRACTITIONER

## 2021-08-16 PROCEDURE — 63710000001 INSULIN LISPRO (HUMAN) PER 5 UNITS: Performed by: NURSE PRACTITIONER

## 2021-08-16 PROCEDURE — 85027 COMPLETE CBC AUTOMATED: CPT | Performed by: NURSE PRACTITIONER

## 2021-08-16 PROCEDURE — 97110 THERAPEUTIC EXERCISES: CPT

## 2021-08-16 PROCEDURE — 90791 PSYCH DIAGNOSTIC EVALUATION: CPT | Performed by: SOCIAL WORKER

## 2021-08-16 PROCEDURE — 63710000001 METHYLPREDNISOLONE 4 MG TABLET THERAPY PACK 21 EACH DISP PACK: Performed by: NURSE PRACTITIONER

## 2021-08-16 RX ORDER — PERMETHRIN 50 MG/G
CREAM TOPICAL ONCE
Status: DISCONTINUED | OUTPATIENT
Start: 2021-08-23 | End: 2021-08-17 | Stop reason: HOSPADM

## 2021-08-16 RX ORDER — METHYLPREDNISOLONE 4 MG/1
8 TABLET ORAL
Status: DISCONTINUED | OUTPATIENT
Start: 2021-08-17 | End: 2021-08-17 | Stop reason: HOSPADM

## 2021-08-16 RX ORDER — INSULIN GLARGINE 100 [IU]/ML
20 INJECTION, SOLUTION SUBCUTANEOUS NIGHTLY
Status: DISCONTINUED | OUTPATIENT
Start: 2021-08-17 | End: 2021-08-17 | Stop reason: HOSPADM

## 2021-08-16 RX ORDER — PERMETHRIN 50 MG/G
CREAM TOPICAL ONCE
Status: COMPLETED | OUTPATIENT
Start: 2021-08-16 | End: 2021-08-17

## 2021-08-16 RX ORDER — METHYLPREDNISOLONE 4 MG/1
4 TABLET ORAL
Status: DISCONTINUED | OUTPATIENT
Start: 2021-08-19 | End: 2021-08-17 | Stop reason: HOSPADM

## 2021-08-16 RX ORDER — METHYLPREDNISOLONE 4 MG/1
8 TABLET ORAL
Status: COMPLETED | OUTPATIENT
Start: 2021-08-16 | End: 2021-08-16

## 2021-08-16 RX ORDER — INSULIN GLARGINE 100 [IU]/ML
10 INJECTION, SOLUTION SUBCUTANEOUS NIGHTLY
Status: DISCONTINUED | OUTPATIENT
Start: 2021-08-16 | End: 2021-08-16

## 2021-08-16 RX ORDER — METHYLPREDNISOLONE 4 MG/1
4 TABLET ORAL
Status: DISCONTINUED | OUTPATIENT
Start: 2021-08-20 | End: 2021-08-17 | Stop reason: HOSPADM

## 2021-08-16 RX ORDER — METHYLPREDNISOLONE 4 MG/1
4 TABLET ORAL
Status: DISCONTINUED | OUTPATIENT
Start: 2021-08-17 | End: 2021-08-17 | Stop reason: HOSPADM

## 2021-08-16 RX ORDER — METHYLPREDNISOLONE 4 MG/1
4 TABLET ORAL
Status: DISCONTINUED | OUTPATIENT
Start: 2021-08-21 | End: 2021-08-17 | Stop reason: HOSPADM

## 2021-08-16 RX ORDER — METHYLPREDNISOLONE 4 MG/1
4 TABLET ORAL
Status: DISCONTINUED | OUTPATIENT
Start: 2021-08-18 | End: 2021-08-17 | Stop reason: HOSPADM

## 2021-08-16 RX ORDER — METHYLPREDNISOLONE 4 MG/1
4 TABLET ORAL
Status: COMPLETED | OUTPATIENT
Start: 2021-08-16 | End: 2021-08-16

## 2021-08-16 RX ORDER — INSULIN GLARGINE 100 [IU]/ML
10 INJECTION, SOLUTION SUBCUTANEOUS ONCE
Status: DISCONTINUED | OUTPATIENT
Start: 2021-08-16 | End: 2021-08-17 | Stop reason: HOSPADM

## 2021-08-16 RX ADMIN — INSULIN LISPRO 4 UNITS: 100 INJECTION, SOLUTION INTRAVENOUS; SUBCUTANEOUS at 09:32

## 2021-08-16 RX ADMIN — METHYLPREDNISOLONE 8 MG: 4 TABLET ORAL at 12:25

## 2021-08-16 RX ADMIN — LOSARTAN POTASSIUM 25 MG: 25 TABLET, FILM COATED ORAL at 09:32

## 2021-08-16 RX ADMIN — METHYLPREDNISOLONE 4 MG: 4 TABLET ORAL at 14:51

## 2021-08-16 RX ADMIN — SODIUM CHLORIDE 100 ML/HR: 9 INJECTION, SOLUTION INTRAVENOUS at 01:19

## 2021-08-16 RX ADMIN — ENOXAPARIN SODIUM 40 MG: 40 INJECTION SUBCUTANEOUS at 18:16

## 2021-08-16 RX ADMIN — INSULIN LISPRO 4 UNITS: 100 INJECTION, SOLUTION INTRAVENOUS; SUBCUTANEOUS at 18:16

## 2021-08-16 RX ADMIN — PERMETHRIN: 50 CREAM TOPICAL at 20:11

## 2021-08-16 RX ADMIN — INSULIN LISPRO 6 UNITS: 100 INJECTION, SOLUTION INTRAVENOUS; SUBCUTANEOUS at 12:25

## 2021-08-16 RX ADMIN — ATORVASTATIN CALCIUM 20 MG: 20 TABLET, FILM COATED ORAL at 09:32

## 2021-08-16 RX ADMIN — METHYLPREDNISOLONE 8 MG: 4 TABLET ORAL at 20:11

## 2021-08-16 RX ADMIN — METHYLPREDNISOLONE 4 MG: 4 TABLET ORAL at 18:16

## 2021-08-16 RX ADMIN — INSULIN GLARGINE 10 UNITS: 100 INJECTION, SOLUTION SUBCUTANEOUS at 20:11

## 2021-08-16 RX ADMIN — SODIUM CHLORIDE 100 ML/HR: 9 INJECTION, SOLUTION INTRAVENOUS at 11:01

## 2021-08-16 RX ADMIN — ENOXAPARIN SODIUM 40 MG: 40 INJECTION SUBCUTANEOUS at 05:14

## 2021-08-16 RX ADMIN — OXYBUTYNIN CHLORIDE 5 MG: 5 TABLET ORAL at 09:32

## 2021-08-16 NOTE — PLAN OF CARE
Goal Outcome Evaluation:  Plan of Care Reviewed With: patient           Outcome Summary: Pt admitted with LOC. Pt very lethargic during initial assessment. Pt became more alert further on in the shift, able to conduct swallow study. Pt passed bedside swallow study. Pt is an assist x1 to BSC. Pt has visible rash all over skin. Other wise stable will continue to monitor.

## 2021-08-16 NOTE — CONSULTS
"Neurology Consult Note    Consult Date: 8/16/2021    Referring MD: Anthony Rosa MD    Reason for Consult I have been asked to see the patient in neurological consultation to render advice and opinion regarding altered mental status, decreased level of consciousness    Annie Myers is a 45 y.o. female with past medical history of sleep apnea, diabetes, hypertension, obesity he was found unresponsive at work last night.  She works third shift at a hotel.  She was found down and given Narcan at the scene and brought to the emergency department.  Tox was positive for narcotics and methamphetamine.  Patient endorsed using methamphetamine to stay awake at work.  She was lethargic for most of last night but is waking up today.  She endorses a history of prior unprovoked seizure several years ago but has had no events since that time.  There is no tongue bite or urinary continence associated with this episode.  Currently she seems mostly back to her neurologic baseline.  She denies any unilateral weakness or numbness but endorses bilateral lower extremity numbness and tingling which has been ongoing for the last 1 year.    Past Medical History:   Diagnosis Date   • Diabetes mellitus (CMS/HCC)    • Hypertension    • Sleep apnea        ROS:  No fevers, chills  No weakness, + numbness    Exam  /73   Pulse 86   Temp 97.5 °F (36.4 °C) (Oral)   Resp 18   Ht 180.3 cm (71\")   Wt (!) 152 kg (336 lb)   LMP 07/15/2021   SpO2 95%   BMI 46.86 kg/m²   Gen: NAD, vitals reviewed  MS: oriented x3, recent/remote memory intact, normal attention/concentration, language intact, no neglect.  CN: visual acuity grossly normal, PERRL, EOMI, no facial droop, no dysarthria  Motor: 5/5 throughout upper and lower extremities, normal tone    DATA:    Lab Results   Component Value Date    GLUCOSE 263 (H) 08/16/2021    CALCIUM 8.4 (L) 08/16/2021     08/16/2021    K 3.5 08/16/2021    CO2 23.5 08/16/2021     " 08/16/2021    BUN 16 08/16/2021    CREATININE 0.72 08/16/2021    EGFRIFNONA 88 08/16/2021    BCR 22.2 08/16/2021    ANIONGAP 11.5 08/16/2021     Lab Results   Component Value Date    WBC 9.52 08/16/2021    HGB 13.7 08/16/2021    HCT 40.9 08/16/2021    MCV 87.2 08/16/2021     08/16/2021       Lab review: CBC normal, BMP significant for elevated glucose, tox positive for opiates and methamphetamine    Imaging review: I personally reviewed her CT head performed yesterday which shows no acute intracranial abnormality, no stroke or ICH seen.  Radiology report reviewed    Diagnoses:  Acute encephalopathy  Drug overdose  Polysubstance abuse  History of seizures    Comment: Acute encephalopathy, likely toxic related to opiate and methamphetamine use.  Currently she is mostly back to her neurologic baseline.  She has a history of prior unprovoked seizures but has not had a seizure in several years and there is nothing in the history here to suggest a seizure given that the event was clearly associated with drug use.    PLAN:  No further neurologic work-up needed.  Agree with substance abuse treatment.  We will see her prn.

## 2021-08-16 NOTE — CONSULTS
Derm Consult  Impression:  Scabies  PE:  Multiple 1 - 2 mm excoriated papules,  Some showing a tract - like presentation on trunk and extremities.  Plan:  I have taken a K O H scraping from the left wrist and the abdomen to look for scabies at my office in the AM.  I will call the results to the pt's nurse tomorrow.            Agree with planned treatment with permethrin cream.

## 2021-08-16 NOTE — THERAPY EVALUATION
Patient Name: Annie Myers  : 1975    MRN: [id number]                              Today's Date: 2021       Admit Date: 8/15/2021    Visit Dx:     ICD-10-CM ICD-9-CM   1. Loss of consciousness (CMS/[facility])  R40.20 780.09   2. Polysubstance abuse (CMS/[facility])  F19.10 305.90   3. Altered mental status, unspecified altered mental status type  R41.82 780.97   4. Scabies  B86 133.0   5. Uncontrolled type 2 diabetes mellitus with hyperglycemia (CMS/[facility])  E11.65 250.02     Patient Active Problem List   Diagnosis   • Loss of consciousness (CMS/[facility])   • Obesity, Class III, BMI 40-49.9 (morbid obesity) (CMS/[facility])   • Type 2 diabetes mellitus (CMS/[facility])   • Illicit drug use   • Essential hypertension   • History of seizures     Past Medical History:   Diagnosis Date   • Diabetes mellitus (CMS/[facility])    • Hypertension    • Sleep apnea      No past surgical history on file.  General Information     Row Name 21 1311          Physical Therapy Time and Intention    Document Type  evaluation  -PC     Mode of Treatment  physical therapy  -PC     Row Name 21 1311          General Information    Patient Profile Reviewed  yes  -PC     Prior Level of Function  independent:  -PC     Existing Precautions/Restrictions  fall  -PC     Row Name 21 1311          Living Environment    Lives With  child(kathy), dependent;friend(s)  -PC     Row Name 21 1311          Cognition    Orientation Status (Cognition)  oriented x 3  -PC     Row Name 21 1311          Safety Issues, Functional Mobility    Impairments Affecting Function (Mobility)  pain  -PC       User Key  (r) = Recorded By, (t) = Taken By, (c) = Cosigned By    Initials Name Provider Type    PC Tia Whittaker PT Physical Therapist        Mobility     Row Name 21 1312          Bed Mobility    Bed Mobility  supine-sit;sit-supine  -PC     Supine-Sit [place] (Bed Mobility)  supervision  -PC     Sit-Supine [place] (Bed Mobility)   supervision  -     Assistive Device (Bed Mobility)  head of bed elevated  -PC     Row Name 08/16/21 1312          Sit-Stand Transfer    Sit-Stand Waterman (Transfers)  contact guard  -PC     Row Name 08/16/21 1312          Gait/Stairs (Locomotion)    Waterman Level (Gait)  contact guard  -PC     Assistive Device (Gait)  -- IV pole  -PC     Distance in Feet (Gait)  30 ft  -PC     Deviations/Abnormal Patterns (Gait)  base of support, wide;tameka decreased;gait speed decreased;stride length decreased  -       User Key  (r) = Recorded By, (t) = Taken By, (c) = Cosigned By    Initials Name Provider Type    PC Tia Whittaker, PT Physical Therapist        Obj/Interventions     Row Name 08/16/21 1312          Range of Motion Comprehensive    General Range of Motion  no range of motion deficits identified  -PC     Row Name 08/16/21 1312          Strength Comprehensive (MMT)    General Manual Muscle Testing (MMT) Assessment  no strength deficits identified  -PC     Row Name 08/16/21 1312          Balance    Balance Assessment  sitting static balance;sitting dynamic balance;standing static balance;standing dynamic balance  -PC     Static Sitting Balance  WNL  -PC     Dynamic Sitting Balance  WNL  -PC     Static Standing Balance  WFL  -PC     Dynamic Standing Balance  WFL  -PC       User Key  (r) = Recorded By, (t) = Taken By, (c) = Cosigned By    Initials Name Provider Type    PC Tia Whittaker, PT Physical Therapist        Goals/Plan     Row Name 08/16/21 1316          Transfer Goal 1 (PT)    Activity/Assistive Device (Transfer Goal 1, PT)  sit-to-stand/stand-to-sit  -     Waterman Level/Cues Needed (Transfer Goal 1, PT)  standby assist  -     Time Frame (Transfer Goal 1, PT)  1 week  -PC     Row Name 08/16/21 1316          Gait Training Goal 1 (PT)    Activity/Assistive Device (Gait Training Goal 1, PT)  gait (walking locomotion)  -     Waterman Level (Gait Training Goal 1, PT)  standby assist   "-PC     Distance (Gait Training Goal 1, PT)  150 ft  -PC     Time Frame (Gait Training Goal 1, PT)  1 week  -PC       User Key  (r) = Recorded By, (t) = Taken By, (c) = Cosigned By    Initials Name Provider Type    PC Tia Whittaker, PT Physical Therapist        Clinical Impression     Row Name 08/16/21 1313          Pain    Additional Documentation  Pain Scale: Numbers Pre/Post-Treatment (Group)  -PC     Row Name 08/16/21 1313          Pain Scale: Numbers Pre/Post-Treatment    Pretreatment Pain Rating  9/10  -PC     Pain Location - Orientation  generalized  -PC     Pre/Posttreatment Pain Comment  pt  c/o generalized pain \"all over\"  -PC     Pain Intervention(s)  Repositioned  -PC     Row Name 08/16/21 1313          Plan of Care Review    Plan of Care Reviewed With  patient  -PC     Outcome Summary  Pt adm with LOC, found down at work, work up ongoing, she moved fairly well today, able to walk with CGA , but was limited by reports of pain \"all over\". Pt will benefit from PT to address mobility deficits, anticipate that pt could return home when medically ready  -PC     Row Name 08/16/21 1313          Therapy Assessment/Plan (PT)    Rehab Potential (PT)  good, to achieve stated therapy goals  -PC     Criteria for Skilled Interventions Met (PT)  yes;meets criteria  -PC     Row Name 08/16/21 1313          Positioning and Restraints    Pre-Treatment Position  in bed  -PC     Post Treatment Position  bed  -PC     In Bed  supine;call light within reach;encouraged to call for assist;exit alarm on  -PC       User Key  (r) = Recorded By, (t) = Taken By, (c) = Cosigned By    Initials Name Provider Type    PC Tia Whittaker, PT Physical Therapist        Outcome Measures     Row Name 08/16/21 1316          How much help from another person do you currently need...    Turning from your back to your side while in flat bed without using bedrails?  3  -PC     Moving from lying on back to sitting on the side of a flat bed without " "bedrails?  3  -PC     Moving to and from a bed to a chair (including a wheelchair)?  3  -PC     Standing up from a chair using your arms (e.g., wheelchair, bedside chair)?  3  -PC     Climbing 3-5 steps with a railing?  3  -PC     To walk in hospital room?  3  -PC     AM-PAC 6 Clicks Score (PT)  18  -PC     Row Name 08/16/21 1316          Functional Assessment    Outcome Measure Options  AM-PAC 6 Clicks Basic Mobility (PT)  -PC       User Key  (r) = Recorded By, (t) = Taken By, (c) = Cosigned By    Initials Name Provider Type    PC Tia Whittaker, PT Physical Therapist                       Physical Therapy Education                 Title: PT OT SLP Therapies (Done)     Topic: Physical Therapy (Done)     Point: Mobility training (Done)     Learning Progress Summary           Patient Acceptance, E,D, DU by PC at 8/16/2021 1317                   Point: Home exercise program (Done)     Learning Progress Summary           Patient Acceptance, E,D, DU by PC at 8/16/2021 1317                   Point: Body mechanics (Done)     Learning Progress Summary           Patient Acceptance, E,D, DU by PC at 8/16/2021 1317                   Point: Precautions (Done)     Learning Progress Summary           Patient Acceptance, E,D, DU by PC at 8/16/2021 1317                               User Key     Initials Effective Dates Name Provider Type Discipline     06/16/21 -  Tia Whittaker PT Physical Therapist PT              PT Recommendation and Plan  Planned Therapy Interventions (PT): transfer training, strengthening, gait training  Plan of Care Reviewed With: patient  Outcome Summary: Pt adm with LOC, found down at work, work up ongoing, she moved fairly well today, able to walk with CGA , but was limited by reports of pain \"all over\". Pt will benefit from PT to address mobility deficits, anticipate that pt could return home when medically ready     Time Calculation:   PT Charges     Row Name 08/16/21 9287             Time " Calculation    Start Time  1042  -PC      Stop Time  1100  -PC      Time Calculation (min)  18 min  -PC      PT Received On  08/16/21  -PC      PT - Next Appointment  08/17/21  -PC      PT Goal Re-Cert Due Date  08/23/21  -PC        User Key  (r) = Recorded By, (t) = Taken By, (c) = Cosigned By    Initials Name Provider Type    PC Tia Whittaker, PT Physical Therapist        Therapy Charges for Today     Code Description Service Date Service Provider Modifiers Qty    77647268133 HC PT EVAL MOD COMPLEXITY 2 8/16/2021 Tia Whittaker, PT GP 1    89009606320  PT THER PROC EA 15 MIN 8/16/2021 Tia Whittaker, PT GP 1          PT G-Codes  Outcome Measure Options: AM-PAC 6 Clicks Basic Mobility (PT)  AM-PAC 6 Clicks Score (PT): 18    iTa Whittaker PT  8/16/2021

## 2021-08-16 NOTE — PLAN OF CARE
Goal Outcome Evaluation:           New admit to 4E. VSS. Pt very drowsy and lethargic on arrival, unable to respond to questions.Currently NPO ,unable to complete bedside swallow d/t patient sleeping in between care.Red rashes noted all over skin.Neuro and assess consulted.Seizure precaution maintained, IVF running @ 100ml/HR,will CTM.

## 2021-08-16 NOTE — PLAN OF CARE
"Goal Outcome Evaluation:  Plan of Care Reviewed With: patient           Outcome Summary: Pt adm with LOC, found down at work, work up ongoing, she moved fairly well today, able to walk with CGA , but was limited by reports of pain \"all over\". Pt will benefit from PT to address mobility deficits, anticipate that pt could return home when medically ready  Patient was intermittently wearing a face mask during this therapy encounter. Therapist used appropriate personal protective equipment including eye protection, mask, and gloves.  Mask used was standard procedure mask. Appropriate PPE was worn during the entire therapy session. Hand hygiene was completed before and after therapy session. Patient is not in enhanced droplet precautions.     "

## 2021-08-16 NOTE — CONSULTS
"Access center evaluated 45-year-old female for drug use.  Patient was brought to the hospital after being found unconscious at work.  Patient works third shift at a hotel and states she uses the meth daily to give her energy to stay awake.  Patient states she has been doing that \"for a while\".  Patient also uses street oxycodone and made the statement that she thinks maybe that is not a good idea anymore.  Patient is newly diagnosed with diabetes and has been going through a lot of fatigue.  Patient states she has had increased stress due to working third shift but also raising 2 teenagers and taking care of her aunt who lives across the street and has cancer.  Patient denies depressed mood but states her anxiety has gotten quite high, up and down.  Patient rates her current anxiety as a 4.  Patient states she has no history of psychiatric care.  Patient denies any suicidal ideation and states she has no history of suicide attempts.  Patient states she had some GIOVANNI treatment \"a long time ago\" at [facility] outpatient.  Patient denies any alcohol use and states she smokes 1 pack of cigarettes a day.  Patient states she does not really have any healthy coping skills other than listening to music.  Patient states her sleep is not good as she has been on third shift for years and that her appetite is not great either.  Patient states she has been drinking a lot of chocolate milk.    Patient lives in a house with a friend and her 2 teenage children.  Patient works third shift.  Patient is on probation but did not state why and stated she will be starting to see a  who will also help her get some Giovanni treatment.  Patient states her mom and cousin are her support system.  Access let patient know they would also give her some resources and she was okay with that.  Access will follow.  "

## 2021-08-16 NOTE — PLAN OF CARE
Problem: Adult Inpatient Plan of Care  Goal: Plan of Care Review  Outcome: Ongoing, Progressing  Flowsheets (Taken 8/16/2021 1728)  Progress: improving  Plan of Care Reviewed With: patient  Outcome Summary: Pt vital stable. Pt slept most of the day. Consult to Dermatology. Contact isolation. Topical cream not to be applied until dermatology does skin scraping. Steroid pack started. Possible dc tomorrow   Goal Outcome Evaluation:  Plan of Care Reviewed With: patient        Progress: improving  Outcome Summary: Pt vital stable. Pt slept most of the day. Consult to Dermotology. Contact isolation. Topical cream not to be applied until dermotolgy does skin scraping. Steriod pack started. Possible dc tomorrow

## 2021-08-17 VITALS
HEART RATE: 91 BPM | HEIGHT: 71 IN | SYSTOLIC BLOOD PRESSURE: 125 MMHG | WEIGHT: 293 LBS | DIASTOLIC BLOOD PRESSURE: 90 MMHG | TEMPERATURE: 98.2 F | OXYGEN SATURATION: 95 % | RESPIRATION RATE: 16 BRPM | BODY MASS INDEX: 41.02 KG/M2

## 2021-08-17 LAB
ANION GAP SERPL CALCULATED.3IONS-SCNC: 7.8 MMOL/L (ref 5–15)
BASOPHILS # BLD AUTO: 0.02 10*3/MM3 (ref 0–0.2)
BASOPHILS NFR BLD AUTO: 0.3 % (ref 0–1.5)
BUN SERPL-MCNC: 13 MG/DL (ref 6–20)
BUN/CREAT SERPL: 21.7 (ref 7–25)
CALCIUM SPEC-SCNC: 8.2 MG/DL (ref 8.6–10.5)
CHLORIDE SERPL-SCNC: 104 MMOL/L (ref 98–107)
CO2 SERPL-SCNC: 22.2 MMOL/L (ref 22–29)
CREAT SERPL-MCNC: 0.6 MG/DL (ref 0.57–1)
DEPRECATED RDW RBC AUTO: 41.4 FL (ref 37–54)
EOSINOPHIL # BLD AUTO: 0.02 10*3/MM3 (ref 0–0.4)
EOSINOPHIL NFR BLD AUTO: 0.3 % (ref 0.3–6.2)
ERYTHROCYTE [DISTWIDTH] IN BLOOD BY AUTOMATED COUNT: 12.8 % (ref 12.3–15.4)
GFR SERPL CREATININE-BSD FRML MDRD: 109 ML/MIN/1.73
GLUCOSE BLDC GLUCOMTR-MCNC: 234 MG/DL (ref 70–130)
GLUCOSE BLDC GLUCOMTR-MCNC: 272 MG/DL (ref 70–130)
GLUCOSE SERPL-MCNC: 222 MG/DL (ref 65–99)
HCT VFR BLD AUTO: 41.8 % (ref 34–46.6)
HGB BLD-MCNC: 13.1 G/DL (ref 12–15.9)
LYMPHOCYTES # BLD AUTO: 1.75 10*3/MM3 (ref 0.7–3.1)
LYMPHOCYTES NFR BLD AUTO: 22.4 % (ref 19.6–45.3)
MCH RBC QN AUTO: 27.7 PG (ref 26.6–33)
MCHC RBC AUTO-ENTMCNC: 31.3 G/DL (ref 31.5–35.7)
MCV RBC AUTO: 88.4 FL (ref 79–97)
MONOCYTES # BLD AUTO: 0.3 10*3/MM3 (ref 0.1–0.9)
MONOCYTES NFR BLD AUTO: 3.8 % (ref 5–12)
NEUTROPHILS NFR BLD AUTO: 5.65 10*3/MM3 (ref 1.7–7)
NEUTROPHILS NFR BLD AUTO: 72.4 % (ref 42.7–76)
PLATELET # BLD AUTO: 207 10*3/MM3 (ref 140–450)
PMV BLD AUTO: 10.8 FL (ref 6–12)
POTASSIUM SERPL-SCNC: 4 MMOL/L (ref 3.5–5.2)
RBC # BLD AUTO: 4.73 10*6/MM3 (ref 3.77–5.28)
SODIUM SERPL-SCNC: 134 MMOL/L (ref 136–145)
WBC # BLD AUTO: 7.8 10*3/MM3 (ref 3.4–10.8)

## 2021-08-17 PROCEDURE — 82962 GLUCOSE BLOOD TEST: CPT

## 2021-08-17 PROCEDURE — G0378 HOSPITAL OBSERVATION PER HR: HCPCS

## 2021-08-17 PROCEDURE — 25010000002 ENOXAPARIN PER 10 MG: Performed by: NURSE PRACTITIONER

## 2021-08-17 PROCEDURE — 63710000001 METHYLPREDNISOLONE 4 MG TABLET THERAPY PACK 21 EACH DISP PACK: Performed by: NURSE PRACTITIONER

## 2021-08-17 PROCEDURE — 85025 COMPLETE CBC W/AUTO DIFF WBC: CPT | Performed by: NURSE PRACTITIONER

## 2021-08-17 PROCEDURE — 96372 THER/PROPH/DIAG INJ SC/IM: CPT

## 2021-08-17 PROCEDURE — 63710000001 INSULIN LISPRO (HUMAN) PER 5 UNITS: Performed by: NURSE PRACTITIONER

## 2021-08-17 PROCEDURE — 80048 BASIC METABOLIC PNL TOTAL CA: CPT | Performed by: NURSE PRACTITIONER

## 2021-08-17 RX ORDER — ASCORBIC ACID 500 MG
500 TABLET ORAL DAILY
Status: DISCONTINUED | OUTPATIENT
Start: 2021-08-20 | End: 2021-08-17 | Stop reason: HOSPADM

## 2021-08-17 RX ORDER — METHYLPREDNISOLONE 4 MG/1
TABLET ORAL
Qty: 21 TABLET | Refills: 0 | Status: SHIPPED | OUTPATIENT
Start: 2021-08-17

## 2021-08-17 RX ORDER — MULTIPLE VITAMINS W/ MINERALS TAB 9MG-400MCG
1 TAB ORAL DAILY
Qty: 30 TABLET | Refills: 1 | Status: SHIPPED | OUTPATIENT
Start: 2021-08-17 | End: 2021-10-16

## 2021-08-17 RX ORDER — ASCORBIC ACID 500 MG
500 TABLET ORAL DAILY
Qty: 69 TABLET | Refills: 1 | Status: SHIPPED | OUTPATIENT
Start: 2021-08-20

## 2021-08-17 RX ORDER — ASCORBIC ACID 250 MG
2000 TABLET ORAL DAILY
Qty: 24 TABLET | Refills: 0 | Status: SHIPPED | OUTPATIENT
Start: 2021-08-17 | End: 2021-08-20

## 2021-08-17 RX ORDER — ASCORBIC ACID 500 MG
2000 TABLET ORAL DAILY
Status: DISCONTINUED | OUTPATIENT
Start: 2021-08-17 | End: 2021-08-17 | Stop reason: HOSPADM

## 2021-08-17 RX ORDER — PERMETHRIN 50 MG/G
CREAM TOPICAL ONCE
Qty: 60 G | Refills: 0 | Status: SHIPPED | OUTPATIENT
Start: 2021-08-23 | End: 2021-08-23

## 2021-08-17 RX ORDER — MULTIPLE VITAMINS W/ MINERALS TAB 9MG-400MCG
1 TAB ORAL DAILY
Status: DISCONTINUED | OUTPATIENT
Start: 2021-08-17 | End: 2021-08-17 | Stop reason: HOSPADM

## 2021-08-17 RX ADMIN — OXYCODONE HYDROCHLORIDE AND ACETAMINOPHEN 2000 MG: 500 TABLET ORAL at 15:42

## 2021-08-17 RX ADMIN — INSULIN LISPRO 6 UNITS: 100 INJECTION, SOLUTION INTRAVENOUS; SUBCUTANEOUS at 12:54

## 2021-08-17 RX ADMIN — METHYLPREDNISOLONE 4 MG: 4 TABLET ORAL at 12:51

## 2021-08-17 RX ADMIN — INSULIN LISPRO 4 UNITS: 100 INJECTION, SOLUTION INTRAVENOUS; SUBCUTANEOUS at 08:27

## 2021-08-17 RX ADMIN — ATORVASTATIN CALCIUM 20 MG: 20 TABLET, FILM COATED ORAL at 08:27

## 2021-08-17 RX ADMIN — ENOXAPARIN SODIUM 40 MG: 40 INJECTION SUBCUTANEOUS at 04:48

## 2021-08-17 RX ADMIN — MULTIPLE VITAMINS W/ MINERALS TAB 1 TABLET: TAB at 15:42

## 2021-08-17 RX ADMIN — LOSARTAN POTASSIUM 25 MG: 25 TABLET, FILM COATED ORAL at 08:27

## 2021-08-17 RX ADMIN — METHYLPREDNISOLONE 4 MG: 4 TABLET ORAL at 08:27

## 2021-08-17 RX ADMIN — OXYBUTYNIN CHLORIDE 5 MG: 5 TABLET ORAL at 08:27

## 2021-08-17 NOTE — PLAN OF CARE
Goal Outcome Evaluation:  Plan of Care Reviewed With: patient           Outcome Summary: VSS. Pt seen by dermatology to day, skin scraping done. MD to call results in AM. Topical cream applied to affected areas. Assist x1 to bedside commode. Will continue to monitor.

## 2021-08-17 NOTE — PROGRESS NOTES
Access did follow up with pt. Access provided GIOVANNI resources to pt. Access will continue to follow.

## 2021-08-17 NOTE — DISCHARGE SUMMARY
Patient Name: Annie Myers  : 1975  MRN: [id number]    Date of Admission: 8/15/2021  Date of Discharge:  2021  Primary Care Physician: Provider, No Known      Chief Complaint:   Syncope and Hyperglycemia      Discharge Diagnoses     Active Hospital Problems    Diagnosis  POA   • **Loss of consciousness (CMS/[facility]) [R40.20]  Yes   • Obesity, Class III, BMI 40-49.9 (morbid obesity) (CMS/[facility]) [E66.01]  Yes   • Type 2 diabetes mellitus (CMS/[facility]) [E11.9]  Yes   • Illicit drug use [F19.90]  Yes   • Essential hypertension [I10]  Yes   • History of seizures [Z87.898]  Not Applicable      Resolved Hospital Problems   No resolved problems to display.        Hospital Course     Ms. Myers is a 45 y.o. female with a history of DM 2, obesity, seizures, hypertension and illicit drug use who presented to [facility] after being found unresposive at work.  Please see the admitting history and physical for further details.  Per neurology she likely had toxic metabolic encephalopathy likely secondary to drug abuse. They did not recommend any further workup as she returned back to her baseline fairly quickly. On admission she was noted to have a rash around her neck/upper chest in which the patient attributed to a new necklace. The rash drastically spread throughout her entire body thought to be consistent with scabies. She was initiated on Permethrin and a Medrol-dose pack. Dermatology was consulted. They took a scraping sample that was actually did not reveal scabies but still recommend treating as if it were. They thought findings might be more consistent with scurvy and recommend beginning oral supplementations with the highest dose of vitamin C and also a multi vitamin with zinc. She will need to follow up with them in two weeks. Her labs and vital signs have remained stable and she is clear for discharge home today. Illicit drug use cessation was discussed and she is agreeable.       Day  of Discharge     Subjective:  no new complaints or events overnight.     denies chest pain, palpitations, SOA, edema, fever, chills, nausea, vomiting and diarrhea.     Physical Exam:  Temp:  [97.5 °F (36.4 °C)-98.2 °F (36.8 °C)] 97.5 °F (36.4 °C)  Heart Rate:  [71-89] 85  Resp:  [16-18] 16  BP: (109-130)/(59-86) 130/83  Body mass index is 45.96 kg/m².  Physical Exam  Vitals and nursing note reviewed.   Constitutional:       Appearance: She is obese.   HENT:      Head: Normocephalic and atraumatic.   Eyes:      Extraocular Movements: Extraocular movements intact.      Conjunctiva/sclera: Conjunctivae normal.   Cardiovascular:      Rate and Rhythm: Normal rate and regular rhythm.   Pulmonary:      Effort: Pulmonary effort is normal. No respiratory distress.      Breath sounds: Normal breath sounds.   Abdominal:      General: Bowel sounds are normal. There is no distension.      Palpations: Abdomen is soft.      Tenderness: There is no abdominal tenderness.   Musculoskeletal:         General: No swelling. Normal range of motion.      Cervical back: Normal range of motion and neck supple.   Skin:     General: Skin is warm and dry.      Findings: Erythema and rash present.      Comments: Multiple excoriated papules   Neurological:      Mental Status: She is alert and oriented to person, place, and time. Mental status is at baseline.   Psychiatric:         Mood and Affect: Mood normal.         Behavior: Behavior normal.         Consultants     Consult Orders (all) (From admission, onward)     Start     Ordered    08/16/21 1219  Inpatient Dermatology Consult  Once     Specialty:  Dermatology  Provider:  Zhen Carr MD    08/16/21 1219    08/15/21 1430  Inpatient Neurology Consult General  Once     Specialty:  Neurology  Provider:  Avery Amaya MD    08/15/21 1429              Procedures     * Surgery not found *      Imaging Results (All)     Procedure Component Value Units Date/Time    CT Abdomen Pelvis  With Contrast [[id number]] Collected: 08/15/21 1217     Updated: 08/15/21 1445    Narrative:      EXAMINATION: CT OF THE ABDOMEN AND PELVIS WITH CONTRAST     HISTORY: 45-year-old female with a history of right lower quadrant pain.        TECHNIQUE: Contiguous axial images were obtained through the abdomen and  pelvis following intravenous administration of nonionic contrast. Oral  contrast  was not administered. Radiation dose reduction techniques were  utilized, including automated exposure control and exposure modulation  based on body size.     COMPARISON: None     FINDINGS: There is focal opacification in the subpleural region of the  right middle lobe and at the base of the lingula. The visualized portion  of the heart has a normal appearance . The liver demonstrates a diffuse  hypodense appearance throughout without evidence for a focal  abnormality. The patient is status post cholecystectomy. The pancreas  appears normal. The kidneys have normal appearance. The adrenal glands  appear normal. The spleen has a normal appearance. There is moderate  retention of stool throughout the colon. A normal appendix is  visualized. There is evidence of prior bilateral tubal ligation  procedure. Otherwise, the uterus and adnexal structures have a grossly  normal CT appearance. The osseous structures of the lumbar spine and  pelvis appear normal.       Impression:      1.There is evidence of diffuse hepatic steatosis.  2. There is moderate retention of stool throughout the colon.  3. There are areas of focal atelectasis and/or scarring at the base of  the right middle lobe and lingula. In the appropriate clinical context,  these areas could represent focal infiltrates at the bases of the right  middle lobe and lingula. Clinical correlation is recommended.     This report was finalized on 8/15/2021 2:42 PM by Dr. Peña Rosales M.D.       CT Head Without Contrast [[id number]] Collected: 08/15/21 1205     Updated: 08/15/21  1219    Narrative:      CT HEAD WO CONTRAST-     INDICATIONS: Change in mental status     TECHNIQUE: Radiation dose reduction techniques were utilized, including  automated exposure control and exposure modulation based on body size.  Noncontrast head CT     COMPARISON: None available     FINDINGS:           No acute intracranial hemorrhage, midline shift or mass effect. No acute  territorial infarct is identified.     Ventricles, cisterns, cerebral sulci are unremarkable for patient age.     The visualized paranasal sinuses, orbits, mastoid air cells are  unremarkable. A partly included circumscribed subcutaneous density  posterior inferior to the left external ear measuring 1.3 cm on image 1  could be a sebaceous cyst, correlate with clinical exam.                   Impression:         No acute intracranial hemorrhage or hydrocephalus. If there is further  clinical concern, MRI could be considered for further evaluation.     This report was finalized on 8/15/2021 12:15 PM by Dr. Giovani Gaitan M.D.               Pertinent Labs     Results from last 7 days   Lab Units 08/17/21  0556 08/16/21  0412 08/15/21  0737   WBC 10*3/mm3 7.80 9.52 10.63   HEMOGLOBIN g/dL 13.1 13.7 12.6   PLATELETS 10*3/mm3 207 204 215     Results from last 7 days   Lab Units 08/17/21  0556 08/16/21  1016 08/15/21  0737   SODIUM mmol/L 134* 137 137   POTASSIUM mmol/L 4.0 3.5 3.5   CHLORIDE mmol/L 104 102 100   CO2 mmol/L 22.2 23.5 27.7   BUN mg/dL 13 16 21*   CREATININE mg/dL 0.60 0.72 1.23*   GLUCOSE mg/dL 222* 263* 354*   Estimated Creatinine Clearance: 194.7 mL/min (by C-G formula based on SCr of 0.6 mg/dL).  Results from last 7 days   Lab Units 08/15/21  0737   ALBUMIN g/dL 3.50   BILIRUBIN mg/dL 0.3   ALK PHOS U/L 84   AST (SGOT) U/L 29   ALT (SGPT) U/L 38*     Results from last 7 days   Lab Units 08/17/21  0556 08/16/21  1016 08/15/21  0737   CALCIUM mg/dL 8.2* 8.4* 9.1   ALBUMIN g/dL  --   --  3.50               Invalid input(s):  LDLCALC      Results from last 7 days   Lab Units 08/15/21  1303   COVID19  Not Detected       Test Results Pending at Discharge       Discharge Details        Discharge Medications      New Medications      Instructions Start Date   ascorbic acid 1000 MG tablet  Commonly known as: VITAMIN C   2,000 mg, Oral, Daily      ascorbic acid 500 MG tablet  Commonly known as: VITAMIN C   500 mg, Oral, Daily   Start Date: August 20, 2021     methylPREDNISolone 4 MG dose pack  Commonly known as: MEDROL   Take as directed on package instructions.      multivitamin with minerals tablet tablet   1 tablet, Oral, Daily      permethrin 5 % cream  Commonly known as: ELIMITE   Topical, Once   Start Date: August 23, 2021        Continue These Medications      Instructions Start Date   atorvastatin 20 MG tablet  Commonly known as: LIPITOR   20 mg, Oral, Daily      Dapagliflozin Propanediol 10 MG tablet   10 mg, Oral, Daily      hydroCHLOROthiazide 25 MG tablet  Commonly known as: HYDRODIURIL   25 mg, Oral, Daily      hydrOXYzine pamoate 50 MG capsule  Commonly known as: VISTARIL   50 mg, Oral      Lantus SoloStar 100 UNIT/ML injection pen  Generic drug: Insulin Glargine   10 Units, Subcutaneous      losartan 25 MG tablet  Commonly known as: COZAAR   25 mg, Oral, Daily      metFORMIN 500 MG tablet  Commonly known as: GLUCOPHAGE   500 mg, Oral, 2 times daily      oxybutynin 5 MG tablet  Commonly known as: DITROPAN   5 mg, Oral, Daily             No Known Allergies    Discharge Disposition:  Home or Self Care      Discharge Diet:  Diet Order   Procedures   • Diet Regular; Cardiac, Consistent Carbohydrate       Discharge Activity:   Activity Instructions     Activity as Tolerated            CODE STATUS:    Code Status and Medical Interventions:   Ordered at: 08/15/21 1429     Code Status:    CPR     Medical Interventions (Level of Support Prior to Arrest):    Full       No future appointments.  Additional Instructions for the Follow-ups  that You Need to Schedule     Discharge Follow-up with PCP   As directed       Currently Documented PCP:    Provider, No Known    PCP Phone Number:    [phone]     Follow Up Details: 1-2 weeks         Discharge Follow-up with Specified Provider: Dermatology; 2 Weeks   As directed      To: Dermatology    Follow Up: 2 Weeks           Follow-up Information     Zhen Carr MD. Schedule an appointment as soon as possible for a visit in 2 week(s).    Specialty: Dermatology  Contact information:  [address] [facility] [address]  [facility] [id number] [phone]             Provider, No Known .    Why: 1-2 weeks  Contact information:  [facility] [id number] [phone]                   Additional Instructions for the Follow-ups that You Need to Schedule     Discharge Follow-up with PCP   As directed       Currently Documented PCP:    Provider, No Known    PCP Phone Number:    [phone]     Follow Up Details: 1-2 weeks         Discharge Follow-up with Specified Provider: Dermatology; 2 Weeks   As directed      To: Dermatology    Follow Up: 2 Weeks           Time Spent on Discharge:  Greater than 30 minutes      BARBARA Patel  [facility]  08/17/21  12:40 EDT

## 2021-08-17 NOTE — CASE MANAGEMENT/SOCIAL WORK
Case Management Discharge Note      Final Note: Home with family to transport. Pt did not have her [employer] Medicaid Card and  [employer] [place] stated theye were unable to run her insurance with out card. Pt's medication cost id $75. [facility] agreed to pay for vitamins with cost of $25. Pt will be sent home with her permethrin 5 % cream at bedside and can then get refill filled if needed and present her [employer] card at that time. Chad Huynh RN-BC         Selected Continued Care - Admitted Since 8/15/2021     Destination    No services have been selected for the patient.              Durable Medical Equipment    No services have been selected for the patient.              Dialysis/Infusion    No services have been selected for the patient.              Home Medical Care    No services have been selected for the patient.              Therapy    No services have been selected for the patient.              Community Resources    No services have been selected for the patient.              Community & DME    No services have been selected for the patient.                  Transportation Services  Private: Car    Final Discharge Disposition Code: 01 - home or self-care

## 2021-08-17 NOTE — PROGRESS NOTES
"Pharmacy to dose ascorbic acid    Day 1  Consult for BARBARA Mclaughlin  Treating: possible scurvy    Relevant clinical data and objective history reviewed:  45 y.o. female 180.3 cm (71\") (!) 149 kg (329 lb 8 oz)    Creatinine   Date Value Ref Range Status   08/17/2021 0.60 0.57 - 1.00 mg/dL Final   08/16/2021 0.72 0.57 - 1.00 mg/dL Final   08/15/2021 1.23 (H) 0.57 - 1.00 mg/dL Final     Estimated Creatinine Clearance: 194.7 mL/min (by C-G formula based on SCr of 0.6 mg/dL).    Temp:  [97.5 °F (36.4 °C)-98.2 °F (36.8 °C)] 97.5 °F (36.4 °C)  Heart Rate:  [71-89] 85  Resp:  [16-18] 16  BP: (109-130)/(59-86) 130/83       PLAN:     1. Ascorbic acid 2g po daily for 3 days, then 500 mg po daily.     Thanks,  Rodríguez Song, Pharm.D, [facility]  08/17/21 13:11 EDT    "

## 2021-08-18 NOTE — CASE MANAGEMENT/SOCIAL WORK
Case Management Discharge Note      Final Note: Home with family to transport.. Pt enrolled in meds to bed. Hope DAWSON CM called pharmacy and gave pt's medicaid information to have meds covered by insurance. Pt was D/C and [facility] stated pt never picked up medication. Hope Clement phoned pt to notify meds had been filled at $0 and could be picked up at [facility]         Selected Continued Care - Discharged on 8/17/2021 Admission date: 8/15/2021 - Discharge disposition: Home or Self Care    Destination    No services have been selected for the patient.              Durable Medical Equipment    No services have been selected for the patient.              Dialysis/Infusion    No services have been selected for the patient.              Home Medical Care    No services have been selected for the patient.              Therapy    No services have been selected for the patient.              Community Resources    No services have been selected for the patient.              Community & DME    No services have been selected for the patient.                  Transportation Services  Private: Car    Final Discharge Disposition Code: 01 - home or self-care

## 2021-08-25 ENCOUNTER — TRANSCRIBE ORDERS (OUTPATIENT)
Dept: PHYSICAL THERAPY | Facility: HOSPITAL | Age: 46
End: 2021-08-25

## 2021-08-25 DIAGNOSIS — M79.89 RIGHT LEG SWELLING: Primary | ICD-10-CM

## 2023-10-16 NOTE — PROGRESS NOTES
Name: Annie Myers ADMIT: 8/15/2021   : 1975  PCP: Provider, No Known    MRN: [id number] LOS: 0 days   AGE/SEX: 45 y.o. female  ROOM: [facility]     Subjective   Subjective   now with a diffuse pruritic body rash. no events overnight. back to mentation baseline but very groggy and falls asleep mid conversation.     Review of Systems   Constitutional: Negative for chills and fever.   Respiratory: Negative for chest tightness and shortness of breath.    Gastrointestinal: Negative for abdominal distention and constipation.   Genitourinary: Negative for difficulty urinating and dysuria.   Skin: Positive for rash.   Neurological: Negative for dizziness and headaches.   Psychiatric/Behavioral: Negative for agitation and confusion.        Objective   Objective   Vital Signs  Temp:  [97.5 °F (36.4 °C)-98 °F (36.7 °C)] 97.5 °F (36.4 °C)  Heart Rate:  [72-86] 86  Resp:  [16-18] 18  BP: ()/(57-88) 118/73  SpO2:  [95 %-98 %] 95 %  on   ;   Device (Oxygen Therapy): room air  Body mass index is 46.86 kg/m².  Physical Exam  Vitals and nursing note reviewed.   Constitutional:       Appearance: She is obese. She is ill-appearing.      Comments: drowsy, arouses   Cardiovascular:      Rate and Rhythm: Normal rate and regular rhythm.   Pulmonary:      Effort: Pulmonary effort is normal. No respiratory distress.      Breath sounds: Normal breath sounds.   Abdominal:      General: Bowel sounds are normal. There is no distension.      Palpations: Abdomen is soft.      Tenderness: There is no abdominal tenderness.   Musculoskeletal:         General: No swelling. Normal range of motion.   Skin:     Findings: Rash (generalized pruritic lesions with erythematous papules) present.   Neurological:      Mental Status: She is oriented to person, place, and time. Mental status is at baseline.         Results Review     I reviewed the patient's new clinical results.  Results from last 7 days   Lab Units 21  0271  Physical Therapy Evaluation    Visit Type: Initial Evaluation -  Daily Treatment Note  Visit: 1  Referring Provider: Musa House MD  Medical Diagnosis (from order): Diagnosis Information    Diagnosis  M25.562, G89.29 (ICD-10-CM) - Chronic pain of left knee       Treatment Diagnosis: left knee - increased pain/symptoms, impaired posture, impaired range of motion, impaired strength and impaired body mechanics.  Onset  - Date of onset:  May 2023  Patient alert and oriented X3.  Chart reviewed at time of initial evaluation (relevant co-morbidities, allergies, tests and medications listed):   unremarkable  Reviewed and signed.      SUBJECTIVE                                                                                                               Patient reports left knee pain. More anterior and medial in nature. Over the kneecap. Fell a few years ago and has had issues since, but more over this past summer. Has been using Voltaren gel and a hinged brace. Has stopped recreationally walking. Difficulty with photography.    Pain / Symptoms  - Pain rating (out of 10): Current: 5   - Location: left knee  - Quality / Description: ache, sore, stiff  - Alleviating Factors: avoiding movement in involved area, rest, over-the-counter medication, supportive device, topical agents/patch    Function:   Limitations / Exacerbation Factors:   - Patient reports pain and difficulty with function reported below.  - , standing tasks, house/yard work, bending/squatting/lifting, floor transfers, community distances, stairs  Prior Level of Function: pain free ADLs and IADLs. no limitation in involved extremity,    Patient Goals: decreased pain and return to sport/leisure activities.    Prior treatment  - no therapies  - Discharged from hospital, home health, or skilled nursing facility in last 30 days: no  Home Environment   - Patient lives with: significant other  - Assistance available: consistent  - Denies 2 or more falls or an  08/15/21  0737   WBC 10*3/mm3 9.52 10.63   HEMOGLOBIN g/dL 13.7 12.6   PLATELETS 10*3/mm3 204 215     Results from last 7 days   Lab Units 08/16/21  1016 08/15/21  0737   SODIUM mmol/L 137 137   POTASSIUM mmol/L 3.5 3.5   CHLORIDE mmol/L 102 100   CO2 mmol/L 23.5 27.7   BUN mg/dL 16 21*   CREATININE mg/dL 0.72 1.23*   GLUCOSE mg/dL 263* 354*   Estimated Creatinine Clearance: 163.8 mL/min (by C-G formula based on SCr of 0.72 mg/dL).  Results from last 7 days   Lab Units 08/15/21  0737   ALBUMIN g/dL 3.50   BILIRUBIN mg/dL 0.3   ALK PHOS U/L 84   AST (SGOT) U/L 29   ALT (SGPT) U/L 38*     Results from last 7 days   Lab Units 08/16/21  1016 08/15/21  0737   CALCIUM mg/dL 8.4* 9.1   ALBUMIN g/dL  --  3.50       COVID19   Date Value Ref Range Status   08/15/2021 Not Detected Not Detected - Ref. Range Final     Hemoglobin A1C   Date/Time Value Ref Range Status   08/15/2021 0737 11.68 (H) 4.80 - 5.60 % Final     Glucose   Date/Time Value Ref Range Status   08/16/2021 1119 254 (H) 70 - 130 mg/dL Final     Comment:     Meter: [id number] : [id number] Colton ROUSE   08/16/2021 0923 233 (H) 70 - 130 mg/dL Final     Comment:     Meter: [id number] : [id number] Adrián Turner RN   08/15/2021 2020 211 (H) 70 - 130 mg/dL Final     Comment:     Meter: [id number] : [id number] Roly Brower NA   08/15/2021 2011 254 (H) 70 - 130 mg/dL Final     Comment:     RN Notified R and V Meter: [id number] : MAURO Mahoney RN   08/15/2021 1251 294 (H) 70 - 130 mg/dL Final     Comment:     Meter: [id number] : [id number] Iris MACKENZIE   08/15/2021 0949 307 (H) 70 - 130 mg/dL Final     Comment:     Meter: [id number] : [id number] Iris MACKENZIE   08/15/2021 0723 345 (H) 70 - 130 mg/dL Final     Comment:     Meter: [id number] : [id number] Hayden Aguirre RN       CT Abdomen Pelvis With Contrast  Narrative: EXAMINATION: CT OF THE ABDOMEN AND PELVIS WITH CONTRAST     HISTORY: 45-year-old female with a history  unexplained fall with injury in the last year.  - Feel safe at home / work / school: yes      OBJECTIVE                                                                                                                    Patellar Assessment:   - Static Left: lateral resting position and lateral tilt      Range of Motion (ROM)   (degrees unless noted; active unless noted; norms in ( ); negative=lacking to 0, positive=beyond 0)  WFL: RLE  Knee:   - Flexion (150):      • Left:  105    - Extension (0-10):      • Left:  0     Strength  (out of 5 unless noted, standard test position unless noted)   WFL: RLE  Hip:    - Flexion:        • Left: 4    - Abduction:        • Left: 4-    - Adduction:        • Left: 4    - Internal Rotation:        • Left: 4    - External Rotation:        • Left: 4-, pain  Knee:    - Flexion:        • Left: 4-    - Extension:        • Left: 4-  Ankle:    - Dorsiflexion:        • Left: 4    - Plantar Flexion:        • Left: 4    - Inversion:        • Left: 4-, pain     - Eversion:        • Left: 4   Comments / Details: Strength tested in sitting         Palpation  Knee: [place]/Tendon/Bone  - Superior Patella: - Left: tenderness  - Medial Patella: - Left: tenderness  - Medial Retinaculum: - Left: tenderness  - Quad Tendon: - Left: tenderness  Joint Play   Patella: Left  - Medial: hypomobile  - Lateral: hypomobile  - Superior: hypomobile and pain  - Inferior: hypomobile    Muscle Flexibility  - Knee Flexors: • Left: minimal limitation        Special Tests  Knee: Ligament  - Anterior Drawer Test:  Left: negative  - Posterior Drawer Test:  Left: negative  - Valgus Stress Test at 0 Degrees:  Left: positive  - Varus Stress Test at 0 Degrees:  Left: negative  Knee: Meniscus   - Thessaly's at 5 Degrees:  Left: negative  - Thessally's at 20 Degrees:  Left: positive  Knee: Patella  - Patellar Compression:  Left: positive  - Patellofemoral Grinding Test:  Left: positive       Ambulation / Gait  - Assistive  device: no assistive device  - Distance (feet unless otherwise indicated): 15  - Assist Level: independent  - Surface: even  - Description: antalgic          Outcome/Assessments  Outcome Measures:   Lower Extremity Functional Scale: LEFS Calculated Total: 52 (0=extreme difficulty; 80=no difficulty) see flowsheet for additional documentation      Treatment     Therapeutic Exercise  Straight leg raise x 5  Supine heel slides x 5  Sidelying hip abduction x 5    Pain with short arc quad, modified preston, and hamstring stretch.    Skilled input: verbal instruction/cues, tactile instruction/cues and posture correction    Writer verbally educated and received verbal consent for hand placement, positioning of patient, and techniques to be performed today from patient for therapist position for techniques and hand placement and palpation for techniques as described above and how they are pertinent to the patient's plan of care.  Home Exercise Program  *above indicates provided as part of home exercise program    Access Code: [email or web address]  URL: [email or web address]  Date: 10/16/2023  Prepared by: Marilee Bui    Exercises  - Supine Active Straight Leg Raise  - 1 x daily - 7 x weekly - 2 sets - 10 reps  - Sidelying Hip Abduction  - 1 x daily - 7 x weekly - 2 sets - 10 reps  - Supine Heel Slide with Strap  - 1 x daily - 7 x weekly - 2 sets - 10 reps      ASSESSMENT                                                                                                          70 year old patient has reported functional limitations listed above impacted by signs and symptoms consistent with treatment diagnosis below.  Treatment Diagnosis:   - Involved: left knee.  - Symptoms/impairments: increased pain/symptoms, impaired posture, impaired range of motion, impaired strength and impaired body mechanics.    Patient presents with signs and symptoms consistent with chronic pain of left knee. Tenderness with  of right lower quadrant pain.        TECHNIQUE: Contiguous axial images were obtained through the abdomen and  pelvis following intravenous administration of nonionic contrast. Oral  contrast  was not administered. Radiation dose reduction techniques were  utilized, including automated exposure control and exposure modulation  based on body size.     COMPARISON: None     FINDINGS: There is focal opacification in the subpleural region of the  right middle lobe and at the base of the lingula. The visualized portion  of the heart has a normal appearance . The liver demonstrates a diffuse  hypodense appearance throughout without evidence for a focal  abnormality. The patient is status post cholecystectomy. The pancreas  appears normal. The kidneys have normal appearance. The adrenal glands  appear normal. The spleen has a normal appearance. There is moderate  retention of stool throughout the colon. A normal appendix is  visualized. There is evidence of prior bilateral tubal ligation  procedure. Otherwise, the uterus and adnexal structures have a grossly  normal CT appearance. The osseous structures of the lumbar spine and  pelvis appear normal.     Impression: 1.There is evidence of diffuse hepatic steatosis.  2. There is moderate retention of stool throughout the colon.  3. There are areas of focal atelectasis and/or scarring at the base of  the right middle lobe and lingula. In the appropriate clinical context,  these areas could represent focal infiltrates at the bases of the right  middle lobe and lingula. Clinical correlation is recommended.     This report was finalized on 8/15/2021 2:42 PM by Dr. Peña Rosales M.D.     CT Head Without Contrast  Narrative: CT HEAD WO CONTRAST-     INDICATIONS: Change in mental status     TECHNIQUE: Radiation dose reduction techniques were utilized, including  automated exposure control and exposure modulation based on body size.  Noncontrast head CT     COMPARISON: None  available     FINDINGS:           No acute intracranial hemorrhage, midline shift or mass effect. No acute  territorial infarct is identified.     Ventricles, cisterns, cerebral sulci are unremarkable for patient age.     The visualized paranasal sinuses, orbits, mastoid air cells are  unremarkable. A partly included circumscribed subcutaneous density  posterior inferior to the left external ear measuring 1.3 cm on image 1  could be a sebaceous cyst, correlate with clinical exam.                 Impression:    No acute intracranial hemorrhage or hydrocephalus. If there is further  clinical concern, MRI could be considered for further evaluation.     This report was finalized on 8/15/2021 12:15 PM by Dr. Giovani Gaitan M.D.       Scheduled Medications  atorvastatin, 20 mg, Oral, Daily  enoxaparin, 40 mg, Subcutaneous, Q12H  insulin lispro, 0-9 Units, Subcutaneous, TID AC  losartan, 25 mg, Oral, Daily  methylPREDNISolone, 4 mg, Oral, After Lunch  methylPREDNISolone, 4 mg, Oral, After Dinner  [START ON 8/17/2021] methylPREDNISolone, 4 mg, Oral, TID Around Food  [START ON 8/18/2021] methylPREDNISolone, 4 mg, Oral, 4x Daily Taper  [START ON 8/19/2021] methylPREDNISolone, 4 mg, Oral, TID Around Food  [START ON 8/20/2021] methylPREDNISolone, 4 mg, Oral, Before Breakfast  [START ON 8/20/2021] methylPREDNISolone, 4 mg, Oral, Tonight  [START ON 8/21/2021] methylPREDNISolone, 4 mg, Oral, Before Breakfast  methylPREDNISolone, 8 mg, Oral, Tonight  [START ON 8/17/2021] methylPREDNISolone, 8 mg, Oral, Tonight  oxybutynin, 5 mg, Oral, Daily  permethrin, , Topical, Once   And  [START ON 8/23/2021] permethrin, , Topical, Once    Infusions  sodium chloride, 100 mL/hr, Last Rate: 100 mL/hr (08/16/21 1101)    Diet  Diet Regular; Cardiac, Consistent Carbohydrate       Assessment/Plan     Active Hospital Problems    Diagnosis  POA   • **Loss of consciousness (CMS/HCC) [R40.20]  Yes   • Obesity, Class III, BMI 40-49.9 (morbid obesity)  palpation over left medial and superior patellar border. Crepitus and hypomobility noted over left patella. Range of motion left knee 0-105 degrees. Positive left valgus and Thessally at 20 degrees. Ambulates independently with a mildly antalgic gait. PT instructed patient on home exercise program and educated on performing as tolerated. Patient to continue to benefit from skilled therapy to work on stretching, strengthening, and functional safe mobility. Low complexity evaluation due to presentation and past medical history. Continue to follow plan of care and address goals. Patient is in agreement.    Prognosis: patient will benefit from skilled therapy  Rehabilitative potential is: good.  Predicted patient presentation: Low (stable) - Patient comorbidities and complexities, as defined above, will have little effect on progress for prescribed plan of care.  Education:   - Present and ready to learn: patient  - Results of above outlined education: Verbalizes understanding, Demonstrates understanding and Needs reinforcement    PLAN                                                                                                                         The following skilled interventions to be implemented to achieve goals listed below:  Neuromuscular Re-Education (39685)  Therapeutic Activity (09649)  Therapeutic Exercise (43684)  Manual Therapy (53464)  Gait Training (57066)  Electrical Stimulation Unattended (52762 or )  Heat/Cold (79578)  Ultrasound (42356)  Dry Needling  Electrical Stimulation Attended (20775)    Frequency / Duration  1 times per week tapering as patient progresses for 12 weeks for an estimated total of 12 visits    Patient involved in and agreed to plan of care and goals.    Suggestions for next session as indicated: Progress per plan of care      Goals  Long Term Goals: to be met by end of plan of care  1. Patient will have increased knee ROM to be able to get into various positions for  (CMS/[facility]) [E66.01]  Yes   • Type 2 diabetes mellitus (CMS/[facility]) [E11.9]  Yes   • Illicit drug use [F19.90]  Yes   • Essential hypertension [I10]  Yes   • History of seizures [Z87.898]  Not Applicable      Resolved Hospital Problems   No resolved problems to display.     · appreciate neurology. acute encephalopathy likely related to opiates and methamphetamines and there is no need for further neurological workup. she is back to mentation baseline though very groggy. DC IVFs. Access following.  · consult dermatology for skin rash. suspect scabies, will need scraping to confirm. treat with Permethrin (wait until scraping preformed) and start on a medrol dose pack.   · blood sugar elevated, continue correctional insulin and resume home dose of lantus. ACHS, hypoglycemic protocol.     · SCDs for DVT prophylaxis.  · Full code.  · Discussed with patient and nursing staff.  · Anticipate discharge hopefully home tomorrow       BARBARA Patel  [facility]  08/16/21  13:47 EDT       photography without an increase in symptoms.  2. Patient will have improved lower extremity strength to be able to navigate a flight of stairs.  3. Patient will demonstrate improved body mechanics to be able to stand for >1 hour without an increase in symptoms.  4. Patient will be independent with progressed and modified home exercise program.  5. Lower Extremity Functional Scale: Patient will complete form to reflect an improved raw score to greater than or equal to 61/80 to indicate patient reported improvement in function/disability/impairment (minimal detectable change: 9 points).      Therapy procedure time and total treatment time can be found documented on the Time Entry flowsheet

## 2024-07-19 PROCEDURE — 93005 ELECTROCARDIOGRAM TRACING: CPT

## 2024-07-19 PROCEDURE — 99285 EMERGENCY DEPT VISIT HI MDM: CPT

## 2024-07-19 PROCEDURE — 93005 ELECTROCARDIOGRAM TRACING: CPT | Performed by: EMERGENCY MEDICINE

## 2024-07-20 ENCOUNTER — APPOINTMENT (OUTPATIENT)
Dept: GENERAL RADIOLOGY | Facility: HOSPITAL | Age: 49
End: 2024-07-20
Payer: MEDICAID

## 2024-07-20 ENCOUNTER — HOSPITAL ENCOUNTER (EMERGENCY)
Facility: HOSPITAL | Age: 49
Discharge: HOME | End: 2024-07-20
Attending: EMERGENCY MEDICINE
Payer: MEDICAID

## 2024-07-20 ENCOUNTER — APPOINTMENT (OUTPATIENT)
Dept: CT IMAGING | Facility: HOSPITAL | Age: 49
End: 2024-07-20
Payer: MEDICAID

## 2024-07-20 VITALS
WEIGHT: 293 LBS | BODY MASS INDEX: 41.02 KG/M2 | OXYGEN SATURATION: 96 % | TEMPERATURE: 98.2 F | DIASTOLIC BLOOD PRESSURE: 107 MMHG | SYSTOLIC BLOOD PRESSURE: 171 MMHG | HEIGHT: 71 IN | RESPIRATION RATE: 16 BRPM | HEART RATE: 82 BPM

## 2024-07-20 DIAGNOSIS — N39.0 ACUTE UTI: ICD-10-CM

## 2024-07-20 DIAGNOSIS — R07.9 CHEST PAIN, UNSPECIFIED TYPE: Primary | ICD-10-CM

## 2024-07-20 LAB
ANION GAP SERPL CALCULATED.3IONS-SCNC: 10.4 MMOL/L (ref 5–15)
APTT PPP: 26.5 SECONDS (ref 61–76.5)
BACTERIA UR QL AUTO: ABNORMAL /HPF
BILIRUB UR QL STRIP: NEGATIVE
BUN SERPL-MCNC: 12 MG/DL (ref 6–20)
BUN/CREAT SERPL: 14.3 (ref 7–25)
CALCIUM SPEC-SCNC: 9.3 MG/DL (ref 8.6–10.5)
CHLORIDE SERPL-SCNC: 99 MMOL/L (ref 98–107)
CLARITY UR: ABNORMAL
CO2 SERPL-SCNC: 25.6 MMOL/L (ref 22–29)
COLOR UR: YELLOW
CREAT SERPL-MCNC: 0.84 MG/DL (ref 0.57–1)
D DIMER PPP FEU-MCNC: 0.6 MG/L (FEU) (ref 0–0.5)
DEPRECATED RDW RBC AUTO: 42.3 FL (ref 37–54)
EGFRCR SERPLBLD CKD-EPI 2021: 86.9 ML/MIN/1.73
EOSINOPHIL # BLD MANUAL: 0.24 10*3/MM3 (ref 0–0.4)
EOSINOPHIL NFR BLD MANUAL: 3 % (ref 0.3–6.2)
ERYTHROCYTE [DISTWIDTH] IN BLOOD BY AUTOMATED COUNT: 12.8 % (ref 12.3–15.4)
GLUCOSE BLDC GLUCOMTR-MCNC: 365 MG/DL (ref 70–105)
GLUCOSE SERPL-MCNC: 549 MG/DL (ref 65–99)
GLUCOSE UR STRIP-MCNC: ABNORMAL MG/DL
HCT VFR BLD AUTO: 43.4 % (ref 34–46.6)
HGB BLD-MCNC: 14.1 G/DL (ref 12–15.9)
HGB UR QL STRIP.AUTO: ABNORMAL
HYALINE CASTS UR QL AUTO: ABNORMAL /LPF
INR PPP: <0.93 (ref 0.93–1.1)
KETONES UR QL STRIP: NEGATIVE
LARGE PLATELETS: ABNORMAL
LEUKOCYTE ESTERASE UR QL STRIP.AUTO: ABNORMAL
LYMPHOCYTES # BLD MANUAL: 2.99 10*3/MM3 (ref 0.7–3.1)
LYMPHOCYTES NFR BLD MANUAL: 10 % (ref 5–12)
MCH RBC QN AUTO: 29.4 PG (ref 26.6–33)
MCHC RBC AUTO-ENTMCNC: 32.5 G/DL (ref 31.5–35.7)
MCV RBC AUTO: 90.4 FL (ref 79–97)
MONOCYTES # BLD: 0.79 10*3/MM3 (ref 0.1–0.9)
MYELOCYTES NFR BLD MANUAL: 1 % (ref 0–0)
NEUTROPHILS # BLD AUTO: 3.78 10*3/MM3 (ref 1.7–7)
NEUTROPHILS NFR BLD MANUAL: 48 % (ref 42.7–76)
NITRITE UR QL STRIP: NEGATIVE
NRBC SPEC MANUAL: 1 /100 WBC (ref 0–0.2)
PH UR STRIP.AUTO: 5.5 [PH] (ref 5–8)
PLATELET # BLD AUTO: 204 10*3/MM3 (ref 140–450)
PMV BLD AUTO: 11.2 FL (ref 6–12)
POTASSIUM SERPL-SCNC: 4 MMOL/L (ref 3.5–5.2)
PROT UR QL STRIP: NEGATIVE
PROTHROMBIN TIME: 9.8 SECONDS (ref 9.6–11.7)
QT INTERVAL: 325 MS
QTC INTERVAL: 458 MS
RBC # BLD AUTO: 4.8 10*6/MM3 (ref 3.77–5.28)
RBC # UR STRIP: ABNORMAL /HPF
RBC MORPH BLD: NORMAL
REF LAB TEST METHOD: ABNORMAL
SCAN SLIDE: NORMAL
SODIUM SERPL-SCNC: 135 MMOL/L (ref 136–145)
SP GR UR STRIP: 1.04 (ref 1–1.03)
SQUAMOUS #/AREA URNS HPF: ABNORMAL /HPF
TOXIC GRANULATION: ABNORMAL
TROPONIN T SERPL HS-MCNC: 6 NG/L
UROBILINOGEN UR QL STRIP: ABNORMAL
VARIANT LYMPHS NFR BLD MANUAL: 34 % (ref 19.6–45.3)
VARIANT LYMPHS NFR BLD MANUAL: 4 % (ref 0–5)
WBC # UR STRIP: ABNORMAL /HPF
WBC NRBC COR # BLD AUTO: 7.87 10*3/MM3 (ref 3.4–10.8)

## 2024-07-20 PROCEDURE — 85730 THROMBOPLASTIN TIME PARTIAL: CPT | Performed by: EMERGENCY MEDICINE

## 2024-07-20 PROCEDURE — 81001 URINALYSIS AUTO W/SCOPE: CPT | Performed by: EMERGENCY MEDICINE

## 2024-07-20 PROCEDURE — 87088 URINE BACTERIA CULTURE: CPT | Performed by: EMERGENCY MEDICINE

## 2024-07-20 PROCEDURE — 84484 ASSAY OF TROPONIN QUANT: CPT | Performed by: EMERGENCY MEDICINE

## 2024-07-20 PROCEDURE — 25010000002 CEFTRIAXONE PER 250 MG: Performed by: EMERGENCY MEDICINE

## 2024-07-20 PROCEDURE — 85007 BL SMEAR W/DIFF WBC COUNT: CPT | Performed by: EMERGENCY MEDICINE

## 2024-07-20 PROCEDURE — 25510000001 IOPAMIDOL PER 1 ML: Performed by: EMERGENCY MEDICINE

## 2024-07-20 PROCEDURE — 80048 BASIC METABOLIC PNL TOTAL CA: CPT | Performed by: EMERGENCY MEDICINE

## 2024-07-20 PROCEDURE — 85379 FIBRIN DEGRADATION QUANT: CPT | Performed by: EMERGENCY MEDICINE

## 2024-07-20 PROCEDURE — 63710000001 INSULIN REGULAR HUMAN PER 5 UNITS: Performed by: EMERGENCY MEDICINE

## 2024-07-20 PROCEDURE — 71045 X-RAY EXAM CHEST 1 VIEW: CPT

## 2024-07-20 PROCEDURE — 71275 CT ANGIOGRAPHY CHEST: CPT

## 2024-07-20 PROCEDURE — 87086 URINE CULTURE/COLONY COUNT: CPT | Performed by: EMERGENCY MEDICINE

## 2024-07-20 PROCEDURE — 82948 REAGENT STRIP/BLOOD GLUCOSE: CPT

## 2024-07-20 PROCEDURE — 85610 PROTHROMBIN TIME: CPT | Performed by: EMERGENCY MEDICINE

## 2024-07-20 PROCEDURE — 87186 SC STD MICRODIL/AGAR DIL: CPT | Performed by: EMERGENCY MEDICINE

## 2024-07-20 PROCEDURE — 96365 THER/PROPH/DIAG IV INF INIT: CPT

## 2024-07-20 PROCEDURE — 25810000003 SODIUM CHLORIDE 0.9 % SOLUTION: Performed by: EMERGENCY MEDICINE

## 2024-07-20 PROCEDURE — 87181 SC STD AGAR DILUTION PER AGT: CPT | Performed by: EMERGENCY MEDICINE

## 2024-07-20 PROCEDURE — 85025 COMPLETE CBC W/AUTO DIFF WBC: CPT | Performed by: EMERGENCY MEDICINE

## 2024-07-20 RX ORDER — SODIUM CHLORIDE 0.9 % (FLUSH) 0.9 %
10 SYRINGE (ML) INJECTION AS NEEDED
Status: DISCONTINUED | OUTPATIENT
Start: 2024-07-20 | End: 2024-07-20 | Stop reason: HOSPADM

## 2024-07-20 RX ORDER — AMOXICILLIN AND CLAVULANATE POTASSIUM 875; 125 MG/1; MG/1
1 TABLET, FILM COATED ORAL 2 TIMES DAILY
Qty: 14 TABLET | Refills: 0 | Status: SHIPPED | OUTPATIENT
Start: 2024-07-20 | End: 2024-07-27

## 2024-07-20 RX ADMIN — SODIUM CHLORIDE 1000 ML: 9 INJECTION, SOLUTION INTRAVENOUS at 03:16

## 2024-07-20 RX ADMIN — IOPAMIDOL 100 ML: 755 INJECTION, SOLUTION INTRAVENOUS at 03:02

## 2024-07-20 RX ADMIN — INSULIN HUMAN 8 UNITS: 100 INJECTION, SOLUTION PARENTERAL at 03:16

## 2024-07-20 RX ADMIN — CEFTRIAXONE 2000 MG: 2 INJECTION, POWDER, FOR SOLUTION INTRAMUSCULAR; INTRAVENOUS at 03:16

## 2024-07-20 NOTE — DISCHARGE INSTRUCTIONS
Follow-up with your primary care provider, call for an appointment  Return to the ED for new or worsening symptoms  Take entire course of antibiotic

## 2024-07-20 NOTE — ED NOTES
Pt c/o chest pain starting yesterday. SOB with activity / ambulation. Pt also c/o vaginal pain and abnormal discharge with onset of apx 1 month ago.

## 2024-07-20 NOTE — ED PROVIDER NOTES
"Subjective   History of Present Illness  Chief complaint: Chest pain    48-year-old female presents with chest pain.  Patient states pain started yesterday and has been intermittent.  She reports mild shortness of breath.  She states pain is worse with movement.  She denies any injuries.  She has had no diaphoresis, dizziness, palpitations, nausea.  She denies any other alleviating or exacerbating factors.  She states she has also had some dysuria and urinary frequency for the past couple weeks and is concerned about urinary tract infection.    History provided by:  Patient      Review of Systems   Constitutional:  Negative for fever.   HENT:  Negative for congestion.    Respiratory:  Positive for shortness of breath. Negative for cough.    Cardiovascular:  Positive for chest pain.   Gastrointestinal:  Negative for abdominal pain and vomiting.   Genitourinary:  Positive for dysuria and frequency.   Musculoskeletal:  Negative for back pain.   Neurological:  Negative for headaches.       Past Medical History:   Diagnosis Date    Diabetes mellitus     Hypertension     Sleep apnea        Allergies   Allergen Reactions    Sulfa Antibiotics Rash       No past surgical history on file.    No family history on file.    Social History     Socioeconomic History    Marital status: Single   Tobacco Use    Smoking status: Every Day     Current packs/day: 1.00     Types: Cigarettes       BP (!) 171/107   Pulse 82   Temp 98.2 °F (36.8 °C) (Oral)   Resp 16   Ht 180.3 cm (71\")   Wt (!) 151 kg (332 lb 3.7 oz)   SpO2 96%   BMI 46.34 kg/m²       Objective   Physical Exam  Vitals and nursing note reviewed.   Constitutional:       Appearance: She is obese.   HENT:      Head: Normocephalic and atraumatic.   Cardiovascular:      Rate and Rhythm: Regular rhythm. Tachycardia present.      Heart sounds: Normal heart sounds.   Pulmonary:      Effort: Pulmonary effort is normal. No respiratory distress.      Breath sounds: Normal breath " sounds.   Abdominal:      Palpations: Abdomen is soft.      Tenderness: There is no abdominal tenderness.   Musculoskeletal:      Right lower leg: No tenderness. No edema.      Left lower leg: No tenderness. No edema.   Skin:     General: Skin is warm and dry.   Neurological:      Mental Status: She is alert and oriented to person, place, and time.         Procedures           ED Course      My interpretation of EKG shows sinus tachycardia, rate of 119, no ST elevation          HEART Score: 2                  Results for orders placed or performed during the hospital encounter of 07/20/24   Basic Metabolic Panel    Specimen: Blood   Result Value Ref Range    Glucose 549 (C) 65 - 99 mg/dL    BUN 12 6 - 20 mg/dL    Creatinine 0.84 0.57 - 1.00 mg/dL    Sodium 135 (L) 136 - 145 mmol/L    Potassium 4.0 3.5 - 5.2 mmol/L    Chloride 99 98 - 107 mmol/L    CO2 25.6 22.0 - 29.0 mmol/L    Calcium 9.3 8.6 - 10.5 mg/dL    BUN/Creatinine Ratio 14.3 7.0 - 25.0    Anion Gap 10.4 5.0 - 15.0 mmol/L    eGFR 86.9 >60.0 mL/min/1.73   Protime-INR    Specimen: Blood   Result Value Ref Range    Protime 9.8 9.6 - 11.7 Seconds    INR <0.93 (L) 0.93 - 1.10   aPTT    Specimen: Blood   Result Value Ref Range    PTT 26.5 (L) 61.0 - 76.5 seconds   Single High Sensitivity Troponin T    Specimen: Blood   Result Value Ref Range    HS Troponin T 6 <14 ng/L   D-dimer, Quantitative    Specimen: Blood   Result Value Ref Range    D-Dimer, Quantitative 0.60 (H) 0.00 - 0.50 mg/L (FEU)   Urinalysis With Culture If Indicated - Urine, Clean Catch    Specimen: Urine, Clean Catch   Result Value Ref Range    Color, UA Yellow Yellow, Straw    Appearance, UA Cloudy (A) Clear    pH, UA 5.5 5.0 - 8.0    Specific Gravity, UA 1.042 (H) 1.005 - 1.030    Glucose, UA >=1000 mg/dL (3+) (A) Negative    Ketones, UA Negative Negative    Bilirubin, UA Negative Negative    Blood, UA Moderate (2+) (A) Negative    Protein, UA Negative Negative    Leuk Esterase, UA Moderate (2+)  (A) Negative    Nitrite, UA Negative Negative    Urobilinogen, UA 0.2 E.U./dL 0.2 - 1.0 E.U./dL   CBC Auto Differential    Specimen: Blood   Result Value Ref Range    WBC 7.87 3.40 - 10.80 10*3/mm3    RBC 4.80 3.77 - 5.28 10*6/mm3    Hemoglobin 14.1 12.0 - 15.9 g/dL    Hematocrit 43.4 34.0 - 46.6 %    MCV 90.4 79.0 - 97.0 fL    MCH 29.4 26.6 - 33.0 pg    MCHC 32.5 31.5 - 35.7 g/dL    RDW 12.8 12.3 - 15.4 %    RDW-SD 42.3 37.0 - 54.0 fl    MPV 11.2 6.0 - 12.0 fL    Platelets 204 140 - 450 10*3/mm3   Scan Slide    Specimen: Blood   Result Value Ref Range    Scan Slide     Urinalysis, Microscopic Only - Urine, Clean Catch    Specimen: Urine, Clean Catch   Result Value Ref Range    RBC, UA 0-2 None Seen, 0-2 /HPF    WBC, UA Too Numerous to Count (A) None Seen, 0-2 /HPF    Bacteria, UA 2+ (A) None Seen /HPF    Squamous Epithelial Cells, UA 0-2 None Seen, 0-2 /HPF    Hyaline Casts, UA None Seen None Seen /LPF    Methodology Manual Light Microscopy    Manual Differential    Specimen: Blood   Result Value Ref Range    Neutrophil % 48.0 42.7 - 76.0 %    Lymphocyte % 34.0 19.6 - 45.3 %    Monocyte % 10.0 5.0 - 12.0 %    Eosinophil % 3.0 0.3 - 6.2 %    Myelocyte % 1.0 (H) 0.0 - 0.0 %    Atypical Lymphocyte % 4.0 0.0 - 5.0 %    Neutrophils Absolute 3.78 1.70 - 7.00 10*3/mm3    Lymphocytes Absolute 2.99 0.70 - 3.10 10*3/mm3    Monocytes Absolute 0.79 0.10 - 0.90 10*3/mm3    Eosinophils Absolute 0.24 0.00 - 0.40 10*3/mm3    nRBC 1.0 (H) 0.0 - 0.2 /100 WBC    RBC Morphology Normal Normal    Toxic Granulation Slight/1+ None Seen    Large Platelets Slight/1+ None Seen   POC Glucose Once    Specimen: Blood   Result Value Ref Range    Glucose 365 (H) 70 - 105 mg/dL   ECG 12 Lead Chest Pain   Result Value Ref Range    QT Interval 325 ms    QTC Interval 458 ms     CT Angiogram Chest Pulmonary Embolism    Result Date: 7/20/2024  Impression: 1.No evidence of pulmonary embolism. 2.Right middle lobe, lingular and right lower lobe  atelectasis. Electronically Signed: Sebas Rasheed MD  7/20/2024 3:41 AM EDT  Workstation ID: [id number]    XR Chest 1 View    Result Date: 7/20/2024  Impression: Bilateral basilar atelectasis. Electronically Signed: Sebas Rasheed MD  7/20/2024 1:53 AM EDT  Workstation ID: [id number]               Medical Decision Making  Problems Addressed:  Acute UTI: complicated acute illness or injury  Chest pain, unspecified type: complicated acute illness or injury    Amount and/or Complexity of Data Reviewed  Labs: ordered.  Radiology: ordered.  ECG/medicine tests: ordered.    Risk  OTC drugs.  Prescription drug management.      My interpretation of chest x-ray shows no infiltrate or effusion.  White blood cell count is normal.  EKG shows no acute ischemia.  Troponin is negative.  BMP is significant for hyperglycemia with a blood sugar of 549.  No evidence of DKA.  Patient was given IV fluids and a dose of insulin.  Urinalysis shows evidence of urinary tract infection with moderate leukocyte esterase, too numerous to count white blood cells, 2+ bacteria.  She was given a dose of Rocephin.  D-dimer is mildly elevated so CT PE protocol has been ordered and is pending at this time.  Care will be transferred to nurse practitioner Princess pending CT results.      Assume care of patient pending CT results which are negative for any acute PE.  Blood sugar improved.  Will treat UTI per plan.    I discussed my findings with the patient.  She is going to be discharged home to follow-up with her primary care provider.  Patient denies visual disturbances, speech disturbances, facial droop, unilateral weakness, numbness or tingling.  Denies difficulty walking or lethargy.        Final diagnoses:   Chest pain, unspecified type   Acute UTI       ED Disposition  ED Disposition       ED Disposition   Discharge    Condition   Stable    Comment   --               [facility] EMERGENCY DEPARTMENT  [address] [facility]  [id number] [phone]        PATIENT CONNECTION - JULIAN  [facility] [address]  [phone]  Schedule an appointment as soon as possible for a visit   Call for assistance with follow up with Primary care provider-call tomorrow.         Medication List        New Prescriptions      amoxicillin-clavulanate 875-125 MG per tablet  Commonly known as: AUGMENTIN  Take 1 tablet by mouth 2 (Two) Times a Day for 7 days.               Where to Get Your Medications        These medications were sent to [id number] - [place], [place] - [address] [place] [address] AT NEC OF  &  - [phone]  - [phone] [address], [place] [address]      Phone: [phone]   amoxicillin-clavulanate 875-125 MG per tablet            Princess Kee, BARBARA  07/20/24 1235

## 2024-07-23 LAB — BACTERIA SPEC AEROBE CULT: ABNORMAL

## 2024-07-23 NOTE — PROGRESS NOTES
Patient urine culture resulted with E. coli. Susceptible to select  Penicillins. Patient was given Rx for amoxicillin-clavulanic acid. Therapy is appropriate coverage. No further follow-up required..    Microbiology Results (last 10 days)       Procedure Component Value - Date/Time    Urine Culture - Urine, Urine, Clean Catch [[id number]]  (Abnormal)  (Susceptibility) Collected: 07/20/24 0152    Lab Status: Final result Specimen: Urine, Clean Catch Updated: 07/23/24 1011     Urine Culture >100,000 CFU/mL Escherichia coli    Narrative:      Colonization of the urinary tract without infection is common. Treatment is discouraged unless the patient is symptomatic, pregnant, or undergoing an invasive urologic procedure.    Susceptibility        Escherichia coli      BRIANNA      Amoxicillin + Clavulanate <=2 ug/ml Susceptible      Ampicillin >=32 ug/ml Resistant      Ampicillin + Sulbactam 4 ug/ml Susceptible      Cefazolin >=64 ug/ml Resistant      Cefepime <=1 ug/ml Susceptible      Ceftazidime <=1 ug/ml Susceptible      Ceftriaxone >=64 ug/ml Resistant      Gentamicin <=1 ug/ml Susceptible      Levofloxacin >=8 ug/ml Resistant      Nitrofurantoin <=16 ug/ml Susceptible      Piperacillin + Tazobactam <=4 ug/ml Susceptible      Trimethoprim + Sulfamethoxazole >=320 ug/ml Resistant                                   Melanie Galvez, Shawn  7/23/2024 13:02 EDT

## 2024-08-11 ENCOUNTER — HOSPITAL ENCOUNTER (OUTPATIENT)
Facility: HOSPITAL | Age: 49
Setting detail: OBSERVATION
Discharge: HOME | End: 2024-08-12
Attending: EMERGENCY MEDICINE | Admitting: EMERGENCY MEDICINE
Payer: MEDICAID

## 2024-08-11 DIAGNOSIS — R73.9 HYPERGLYCEMIA: ICD-10-CM

## 2024-08-11 DIAGNOSIS — N39.0 ACUTE UTI: Primary | ICD-10-CM

## 2024-08-11 LAB
BACTERIA UR QL AUTO: ABNORMAL /HPF
BILIRUB UR QL STRIP: NEGATIVE
CLARITY UR: ABNORMAL
COLOR UR: YELLOW
GLUCOSE BLDC GLUCOMTR-MCNC: 597 MG/DL (ref 70–105)
GLUCOSE UR STRIP-MCNC: ABNORMAL MG/DL
HGB UR QL STRIP.AUTO: ABNORMAL
HYALINE CASTS UR QL AUTO: ABNORMAL /LPF
KETONES UR QL STRIP: NEGATIVE
LEUKOCYTE ESTERASE UR QL STRIP.AUTO: ABNORMAL
NITRITE UR QL STRIP: NEGATIVE
PH UR STRIP.AUTO: 5.5 [PH] (ref 5–8)
PROT UR QL STRIP: NEGATIVE
RBC # UR STRIP: ABNORMAL /HPF
REF LAB TEST METHOD: ABNORMAL
SP GR UR STRIP: 1.04 (ref 1–1.03)
SQUAMOUS #/AREA URNS HPF: ABNORMAL /HPF
TRICHOMONAS #/AREA URNS HPF: ABNORMAL /HPF
UROBILINOGEN UR QL STRIP: ABNORMAL
WBC # UR STRIP: ABNORMAL /HPF

## 2024-08-11 PROCEDURE — 82009 KETONE BODYS QUAL: CPT | Performed by: NURSE PRACTITIONER

## 2024-08-11 PROCEDURE — 81025 URINE PREGNANCY TEST: CPT | Performed by: NURSE PRACTITIONER

## 2024-08-11 PROCEDURE — 80053 COMPREHEN METABOLIC PANEL: CPT | Performed by: NURSE PRACTITIONER

## 2024-08-11 PROCEDURE — 36415 COLL VENOUS BLD VENIPUNCTURE: CPT

## 2024-08-11 PROCEDURE — 81001 URINALYSIS AUTO W/SCOPE: CPT | Performed by: EMERGENCY MEDICINE

## 2024-08-11 PROCEDURE — 85007 BL SMEAR W/DIFF WBC COUNT: CPT | Performed by: NURSE PRACTITIONER

## 2024-08-11 PROCEDURE — 82948 REAGENT STRIP/BLOOD GLUCOSE: CPT

## 2024-08-11 PROCEDURE — 99285 EMERGENCY DEPT VISIT HI MDM: CPT

## 2024-08-11 PROCEDURE — 87086 URINE CULTURE/COLONY COUNT: CPT | Performed by: EMERGENCY MEDICINE

## 2024-08-11 PROCEDURE — 85025 COMPLETE CBC W/AUTO DIFF WBC: CPT | Performed by: NURSE PRACTITIONER

## 2024-08-11 PROCEDURE — 87147 CULTURE TYPE IMMUNOLOGIC: CPT | Performed by: EMERGENCY MEDICINE

## 2024-08-11 PROCEDURE — 83690 ASSAY OF LIPASE: CPT | Performed by: NURSE PRACTITIONER

## 2024-08-12 VITALS
HEIGHT: 71 IN | BODY MASS INDEX: 41.02 KG/M2 | TEMPERATURE: 98 F | WEIGHT: 293 LBS | OXYGEN SATURATION: 97 % | HEART RATE: 91 BPM | DIASTOLIC BLOOD PRESSURE: 88 MMHG | SYSTOLIC BLOOD PRESSURE: 129 MMHG | RESPIRATION RATE: 16 BRPM

## 2024-08-12 PROBLEM — A59.01 TRICHOMONAS VAGINALIS (TV) INFECTION: Status: ACTIVE | Noted: 2024-08-12

## 2024-08-12 PROBLEM — N39.0 UTI (URINARY TRACT INFECTION): Status: ACTIVE | Noted: 2024-08-12

## 2024-08-12 PROBLEM — R73.9 HYPERGLYCEMIA: Status: ACTIVE | Noted: 2024-08-12

## 2024-08-12 PROBLEM — N39.0 UTI (URINARY TRACT INFECTION): Status: RESOLVED | Noted: 2024-08-12 | Resolved: 2024-08-12

## 2024-08-12 LAB
ACETONE BLD QL: NEGATIVE
ALBUMIN SERPL-MCNC: 3.7 G/DL (ref 3.5–5.2)
ALBUMIN/GLOB SERPL: 1.1 G/DL
ALP SERPL-CCNC: 110 U/L (ref 39–117)
ALT SERPL W P-5'-P-CCNC: 30 U/L (ref 1–33)
ANION GAP SERPL CALCULATED.3IONS-SCNC: 10 MMOL/L (ref 5–15)
ANION GAP SERPL CALCULATED.3IONS-SCNC: 12.8 MMOL/L (ref 5–15)
ANISOCYTOSIS BLD QL: ABNORMAL
ARTICHOKE IGE QN: 40 MG/DL (ref 0–100)
AST SERPL-CCNC: 21 U/L (ref 1–32)
ATMOSPHERIC PRESS: ABNORMAL MM[HG]
B-HCG UR QL: NEGATIVE
BASE EXCESS BLDV CALC-SCNC: -6.1 MMOL/L (ref -2–2)
BILIRUB SERPL-MCNC: 0.2 MG/DL (ref 0–1.2)
BUN SERPL-MCNC: 15 MG/DL (ref 6–20)
BUN SERPL-MCNC: 20 MG/DL (ref 6–20)
BUN/CREAT SERPL: 20.8 (ref 7–25)
BUN/CREAT SERPL: 23 (ref 7–25)
C TRACH RRNA CVX QL NAA+PROBE: NOT DETECTED
CALCIUM SPEC-SCNC: 9.1 MG/DL (ref 8.6–10.5)
CALCIUM SPEC-SCNC: 9.5 MG/DL (ref 8.6–10.5)
CHLORIDE SERPL-SCNC: 102 MMOL/L (ref 98–107)
CHLORIDE SERPL-SCNC: 93 MMOL/L (ref 98–107)
CHOLEST SERPL-MCNC: 219 MG/DL (ref 0–200)
CO2 BLDA-SCNC: 19.2 MMOL/L (ref 22–29)
CO2 SERPL-SCNC: 24 MMOL/L (ref 22–29)
CO2 SERPL-SCNC: 24.2 MMOL/L (ref 22–29)
CREAT SERPL-MCNC: 0.72 MG/DL (ref 0.57–1)
CREAT SERPL-MCNC: 0.87 MG/DL (ref 0.57–1)
DACRYOCYTES BLD QL SMEAR: ABNORMAL
DEPRECATED RDW RBC AUTO: 42.3 FL (ref 37–54)
DEPRECATED RDW RBC AUTO: 42.9 FL (ref 37–54)
EGFRCR SERPLBLD CKD-EPI 2021: 104.6 ML/MIN/1.73
EGFRCR SERPLBLD CKD-EPI 2021: 83.3 ML/MIN/1.73
EOSINOPHIL # BLD MANUAL: 0.15 10*3/MM3 (ref 0–0.4)
EOSINOPHIL # BLD MANUAL: 0.21 10*3/MM3 (ref 0–0.4)
EOSINOPHIL NFR BLD MANUAL: 2 % (ref 0.3–6.2)
EOSINOPHIL NFR BLD MANUAL: 3 % (ref 0.3–6.2)
ERYTHROCYTE [DISTWIDTH] IN BLOOD BY AUTOMATED COUNT: 12.9 % (ref 12.3–15.4)
ERYTHROCYTE [DISTWIDTH] IN BLOOD BY AUTOMATED COUNT: 13 % (ref 12.3–15.4)
GLOBULIN UR ELPH-MCNC: 3.3 GM/DL
GLUCOSE BLDC GLUCOMTR-MCNC: 332 MG/DL (ref 70–105)
GLUCOSE BLDC GLUCOMTR-MCNC: 353 MG/DL (ref 70–105)
GLUCOSE BLDC GLUCOMTR-MCNC: 360 MG/DL (ref 70–105)
GLUCOSE BLDC GLUCOMTR-MCNC: 384 MG/DL (ref 70–105)
GLUCOSE BLDC GLUCOMTR-MCNC: 522 MG/DL (ref 70–105)
GLUCOSE SERPL-MCNC: 380 MG/DL (ref 65–99)
GLUCOSE SERPL-MCNC: 690 MG/DL (ref 65–99)
HBA1C MFR BLD: 16.3 % (ref 4.8–5.6)
HCO3 BLDV-SCNC: 18.2 MMOL/L (ref 22–26)
HCT VFR BLD AUTO: 42.4 % (ref 34–46.6)
HCT VFR BLD AUTO: 42.5 % (ref 34–46.6)
HDLC SERPL-MCNC: 22 MG/DL (ref 40–60)
HGB BLD-MCNC: 14.1 G/DL (ref 12–15.9)
HGB BLD-MCNC: 14.2 G/DL (ref 12–15.9)
INHALED O2 CONCENTRATION: 21 %
LARGE PLATELETS: ABNORMAL
LDLC SERPL CALC-MCNC: ABNORMAL MG/DL
LDLC/HDLC SERPL: ABNORMAL {RATIO}
LIPASE SERPL-CCNC: 75 U/L (ref 13–60)
LYMPHOCYTES # BLD MANUAL: 1.89 10*3/MM3 (ref 0.7–3.1)
LYMPHOCYTES # BLD MANUAL: 3.37 10*3/MM3 (ref 0.7–3.1)
LYMPHOCYTES NFR BLD MANUAL: 3 % (ref 5–12)
LYMPHOCYTES NFR BLD MANUAL: 9 % (ref 5–12)
MCH RBC QN AUTO: 30 PG (ref 26.6–33)
MCH RBC QN AUTO: 30.2 PG (ref 26.6–33)
MCHC RBC AUTO-ENTMCNC: 33.3 G/DL (ref 31.5–35.7)
MCHC RBC AUTO-ENTMCNC: 33.4 G/DL (ref 31.5–35.7)
MCV RBC AUTO: 89.7 FL (ref 79–97)
MCV RBC AUTO: 90.8 FL (ref 79–97)
MODALITY: ABNORMAL
MONOCYTES # BLD: 0.21 10*3/MM3 (ref 0.1–0.9)
MONOCYTES # BLD: 0.67 10*3/MM3 (ref 0.1–0.9)
N GONORRHOEA RRNA SPEC QL NAA+PROBE: NOT DETECTED
NEUTROPHILS # BLD AUTO: 3.29 10*3/MM3 (ref 1.7–7)
NEUTROPHILS # BLD AUTO: 4.68 10*3/MM3 (ref 1.7–7)
NEUTROPHILS NFR BLD MANUAL: 44 % (ref 42.7–76)
NEUTROPHILS NFR BLD MANUAL: 64 % (ref 42.7–76)
NEUTS BAND NFR BLD MANUAL: 3 % (ref 0–5)
PCO2 BLDV: 31.4 MM HG (ref 42–51)
PH BLDV: 7.37 PH UNITS (ref 7.32–7.43)
PLATELET # BLD AUTO: 191 10*3/MM3 (ref 140–450)
PLATELET # BLD AUTO: 197 10*3/MM3 (ref 140–450)
PMV BLD AUTO: 11 FL (ref 6–12)
PMV BLD AUTO: 11 FL (ref 6–12)
PO2 BLDV: 49.7 MM HG (ref 40–42)
POIKILOCYTOSIS BLD QL SMEAR: ABNORMAL
POIKILOCYTOSIS BLD QL SMEAR: ABNORMAL
POTASSIUM SERPL-SCNC: 3.8 MMOL/L (ref 3.5–5.2)
POTASSIUM SERPL-SCNC: 4.4 MMOL/L (ref 3.5–5.2)
PROT SERPL-MCNC: 7 G/DL (ref 6–8.5)
RBC # BLD AUTO: 4.67 10*6/MM3 (ref 3.77–5.28)
RBC # BLD AUTO: 4.74 10*6/MM3 (ref 3.77–5.28)
SAO2 % BLDCOV: 84.4 % (ref 45–75)
SCAN SLIDE: NORMAL
SCAN SLIDE: NORMAL
SMALL PLATELETS BLD QL SMEAR: ADEQUATE
SODIUM SERPL-SCNC: 130 MMOL/L (ref 136–145)
SODIUM SERPL-SCNC: 136 MMOL/L (ref 136–145)
T4 FREE SERPL-MCNC: 0.95 NG/DL (ref 0.93–1.7)
TRIGL SERPL-MCNC: 1483 MG/DL (ref 0–150)
TSH SERPL DL<=0.05 MIU/L-ACNC: 1.93 UIU/ML (ref 0.27–4.2)
VARIANT LYMPHS NFR BLD MANUAL: 1 % (ref 0–5)
VARIANT LYMPHS NFR BLD MANUAL: 1 % (ref 0–5)
VARIANT LYMPHS NFR BLD MANUAL: 26 % (ref 19.6–45.3)
VARIANT LYMPHS NFR BLD MANUAL: 44 % (ref 19.6–45.3)
VLDLC SERPL-MCNC: ABNORMAL MG/DL
WBC MORPH BLD: NORMAL
WBC MORPH BLD: NORMAL
WBC NRBC COR # BLD AUTO: 6.99 10*3/MM3 (ref 3.4–10.8)
WBC NRBC COR # BLD AUTO: 7.48 10*3/MM3 (ref 3.4–10.8)

## 2024-08-12 PROCEDURE — 83721 ASSAY OF BLOOD LIPOPROTEIN: CPT | Performed by: NURSE PRACTITIONER

## 2024-08-12 PROCEDURE — 99204 OFFICE O/P NEW MOD 45 MIN: CPT | Performed by: INTERNAL MEDICINE

## 2024-08-12 PROCEDURE — 85007 BL SMEAR W/DIFF WBC COUNT: CPT | Performed by: EMERGENCY MEDICINE

## 2024-08-12 PROCEDURE — 87591 N.GONORRHOEAE DNA AMP PROB: CPT | Performed by: NURSE PRACTITIONER

## 2024-08-12 PROCEDURE — 84443 ASSAY THYROID STIM HORMONE: CPT | Performed by: NURSE PRACTITIONER

## 2024-08-12 PROCEDURE — 84439 ASSAY OF FREE THYROXINE: CPT | Performed by: NURSE PRACTITIONER

## 2024-08-12 PROCEDURE — 25810000003 SODIUM CHLORIDE 0.9 % SOLUTION: Performed by: NURSE PRACTITIONER

## 2024-08-12 PROCEDURE — 82948 REAGENT STRIP/BLOOD GLUCOSE: CPT | Performed by: NURSE PRACTITIONER

## 2024-08-12 PROCEDURE — 82948 REAGENT STRIP/BLOOD GLUCOSE: CPT

## 2024-08-12 PROCEDURE — G0378 HOSPITAL OBSERVATION PER HR: HCPCS

## 2024-08-12 PROCEDURE — 80061 LIPID PANEL: CPT | Performed by: NURSE PRACTITIONER

## 2024-08-12 PROCEDURE — 63710000001 INSULIN LISPRO (HUMAN) PER 5 UNITS: Performed by: NURSE PRACTITIONER

## 2024-08-12 PROCEDURE — 80048 BASIC METABOLIC PNL TOTAL CA: CPT | Performed by: EMERGENCY MEDICINE

## 2024-08-12 PROCEDURE — 87491 CHLMYD TRACH DNA AMP PROBE: CPT | Performed by: NURSE PRACTITIONER

## 2024-08-12 PROCEDURE — 25010000002 CEFTRIAXONE PER 250 MG: Performed by: NURSE PRACTITIONER

## 2024-08-12 PROCEDURE — 83036 HEMOGLOBIN GLYCOSYLATED A1C: CPT | Performed by: NURSE PRACTITIONER

## 2024-08-12 PROCEDURE — 82803 BLOOD GASES ANY COMBINATION: CPT | Performed by: NURSE PRACTITIONER

## 2024-08-12 PROCEDURE — 85025 COMPLETE CBC W/AUTO DIFF WBC: CPT | Performed by: EMERGENCY MEDICINE

## 2024-08-12 PROCEDURE — 63710000001 INSULIN GLARGINE PER 5 UNITS: Performed by: NURSE PRACTITIONER

## 2024-08-12 PROCEDURE — 96365 THER/PROPH/DIAG IV INF INIT: CPT

## 2024-08-12 PROCEDURE — G0108 DIAB MANAGE TRN  PER INDIV: HCPCS

## 2024-08-12 RX ORDER — LANCETS
1 EACH MISCELLANEOUS
Qty: 100 EACH | Refills: 2 | Status: SHIPPED | OUTPATIENT
Start: 2024-08-12

## 2024-08-12 RX ORDER — INSULIN ASPART 100 [IU]/ML
12 INJECTION, SOLUTION INTRAVENOUS; SUBCUTANEOUS
Qty: 15 ML | Refills: 2 | Status: SHIPPED | OUTPATIENT
Start: 2024-08-12

## 2024-08-12 RX ORDER — AMOXICILLIN 250 MG
2 CAPSULE ORAL 2 TIMES DAILY PRN
Status: DISCONTINUED | OUTPATIENT
Start: 2024-08-12 | End: 2024-08-12 | Stop reason: HOSPADM

## 2024-08-12 RX ORDER — IBUPROFEN 400 MG/1
400 TABLET ORAL EVERY 6 HOURS PRN
Status: DISCONTINUED | OUTPATIENT
Start: 2024-08-12 | End: 2024-08-12 | Stop reason: HOSPADM

## 2024-08-12 RX ORDER — SODIUM CHLORIDE 0.9 % (FLUSH) 0.9 %
10 SYRINGE (ML) INJECTION EVERY 12 HOURS SCHEDULED
Status: DISCONTINUED | OUTPATIENT
Start: 2024-08-12 | End: 2024-08-12 | Stop reason: HOSPADM

## 2024-08-12 RX ORDER — PEN NEEDLE, DIABETIC 30 GX3/16"
1 NEEDLE, DISPOSABLE MISCELLANEOUS
Qty: 200 EACH | Refills: 2 | Status: SHIPPED | OUTPATIENT
Start: 2024-08-12

## 2024-08-12 RX ORDER — ATORVASTATIN CALCIUM 20 MG/1
20 TABLET, FILM COATED ORAL DAILY
COMMUNITY
Start: 2024-04-26

## 2024-08-12 RX ORDER — LEVOTHYROXINE SODIUM 0.1 MG/1
100 TABLET ORAL
Status: DISCONTINUED | OUTPATIENT
Start: 2024-08-12 | End: 2024-08-12 | Stop reason: HOSPADM

## 2024-08-12 RX ORDER — SODIUM CHLORIDE 0.9 % (FLUSH) 0.9 %
10 SYRINGE (ML) INJECTION AS NEEDED
Status: DISCONTINUED | OUTPATIENT
Start: 2024-08-12 | End: 2024-08-12 | Stop reason: HOSPADM

## 2024-08-12 RX ORDER — INSULIN LISPRO 100 [IU]/ML
1-200 INJECTION, SOLUTION INTRAVENOUS; SUBCUTANEOUS
Status: DISCONTINUED | OUTPATIENT
Start: 2024-08-12 | End: 2024-08-12 | Stop reason: HOSPADM

## 2024-08-12 RX ORDER — METRONIDAZOLE 500 MG/1
500 TABLET ORAL EVERY 12 HOURS SCHEDULED
Status: DISCONTINUED | OUTPATIENT
Start: 2024-08-12 | End: 2024-08-12 | Stop reason: HOSPADM

## 2024-08-12 RX ORDER — ONDANSETRON 2 MG/ML
4 INJECTION INTRAMUSCULAR; INTRAVENOUS EVERY 6 HOURS PRN
Status: DISCONTINUED | OUTPATIENT
Start: 2024-08-12 | End: 2024-08-12 | Stop reason: HOSPADM

## 2024-08-12 RX ORDER — NICOTINE POLACRILEX 4 MG
15 LOZENGE BUCCAL
Status: DISCONTINUED | OUTPATIENT
Start: 2024-08-12 | End: 2024-08-12 | Stop reason: HOSPADM

## 2024-08-12 RX ORDER — NICOTINE 21 MG/24HR
1 PATCH, TRANSDERMAL 24 HOURS TRANSDERMAL
Status: DISCONTINUED | OUTPATIENT
Start: 2024-08-12 | End: 2024-08-12 | Stop reason: HOSPADM

## 2024-08-12 RX ORDER — BLOOD SUGAR DIAGNOSTIC
1 STRIP MISCELLANEOUS
Qty: 100 EACH | Refills: 2 | Status: SHIPPED | OUTPATIENT
Start: 2024-08-12

## 2024-08-12 RX ORDER — LEVOTHYROXINE SODIUM 0.1 MG/1
100 TABLET ORAL DAILY
COMMUNITY
Start: 2024-04-26

## 2024-08-12 RX ORDER — POLYETHYLENE GLYCOL 3350 17 G/17G
17 POWDER, FOR SOLUTION ORAL DAILY PRN
Status: DISCONTINUED | OUTPATIENT
Start: 2024-08-12 | End: 2024-08-12 | Stop reason: HOSPADM

## 2024-08-12 RX ORDER — TIOTROPIUM BROMIDE AND OLODATEROL 3.124; 2.736 UG/1; UG/1
2 SPRAY, METERED RESPIRATORY (INHALATION) DAILY
COMMUNITY
Start: 2024-04-26

## 2024-08-12 RX ORDER — BISACODYL 5 MG/1
5 TABLET, DELAYED RELEASE ORAL DAILY PRN
Status: DISCONTINUED | OUTPATIENT
Start: 2024-08-12 | End: 2024-08-12 | Stop reason: HOSPADM

## 2024-08-12 RX ORDER — INSULIN LISPRO 100 [IU]/ML
1-200 INJECTION, SOLUTION INTRAVENOUS; SUBCUTANEOUS AS NEEDED
Status: DISCONTINUED | OUTPATIENT
Start: 2024-08-12 | End: 2024-08-12 | Stop reason: HOSPADM

## 2024-08-12 RX ORDER — BLOOD-GLUCOSE METER
1 EACH MISCELLANEOUS
Qty: 1 KIT | Refills: 0 | Status: SHIPPED | OUTPATIENT
Start: 2024-08-12

## 2024-08-12 RX ORDER — IBUPROFEN 200 MG
400 TABLET ORAL 3 TIMES DAILY
COMMUNITY

## 2024-08-12 RX ORDER — FUROSEMIDE 20 MG/1
20 TABLET ORAL DAILY PRN
COMMUNITY
Start: 2024-04-26

## 2024-08-12 RX ORDER — ATORVASTATIN CALCIUM 20 MG/1
20 TABLET, FILM COATED ORAL DAILY
Status: DISCONTINUED | OUTPATIENT
Start: 2024-08-12 | End: 2024-08-12 | Stop reason: HOSPADM

## 2024-08-12 RX ORDER — METRONIDAZOLE 500 MG/1
500 TABLET ORAL EVERY 12 HOURS SCHEDULED
Qty: 12 TABLET | Refills: 0 | Status: SHIPPED | OUTPATIENT
Start: 2024-08-12 | End: 2024-08-18

## 2024-08-12 RX ORDER — IBUPROFEN 600 MG/1
1 TABLET ORAL
Status: DISCONTINUED | OUTPATIENT
Start: 2024-08-12 | End: 2024-08-12 | Stop reason: HOSPADM

## 2024-08-12 RX ORDER — ALBUTEROL SULFATE 90 UG/1
2 AEROSOL, METERED RESPIRATORY (INHALATION) EVERY 4 HOURS PRN
COMMUNITY
Start: 2024-04-26 | End: 2025-04-26

## 2024-08-12 RX ORDER — SODIUM CHLORIDE 9 MG/ML
40 INJECTION, SOLUTION INTRAVENOUS AS NEEDED
Status: DISCONTINUED | OUTPATIENT
Start: 2024-08-12 | End: 2024-08-12 | Stop reason: HOSPADM

## 2024-08-12 RX ORDER — BISACODYL 10 MG
10 SUPPOSITORY, RECTAL RECTAL DAILY PRN
Status: DISCONTINUED | OUTPATIENT
Start: 2024-08-12 | End: 2024-08-12 | Stop reason: HOSPADM

## 2024-08-12 RX ORDER — DEXTROSE MONOHYDRATE 25 G/50ML
10-50 INJECTION, SOLUTION INTRAVENOUS
Status: DISCONTINUED | OUTPATIENT
Start: 2024-08-12 | End: 2024-08-12 | Stop reason: HOSPADM

## 2024-08-12 RX ORDER — DIPHENOXYLATE HYDROCHLORIDE AND ATROPINE SULFATE 2.5; .025 MG/1; MG/1
1 TABLET ORAL DAILY
COMMUNITY

## 2024-08-12 RX ORDER — FUROSEMIDE 20 MG/1
20 TABLET ORAL DAILY
Status: DISCONTINUED | OUTPATIENT
Start: 2024-08-12 | End: 2024-08-12 | Stop reason: HOSPADM

## 2024-08-12 RX ORDER — SODIUM CHLORIDE 9 MG/ML
75 INJECTION, SOLUTION INTRAVENOUS CONTINUOUS
Status: DISCONTINUED | OUTPATIENT
Start: 2024-08-12 | End: 2024-08-12

## 2024-08-12 RX ADMIN — METRONIDAZOLE 500 MG: 500 TABLET ORAL at 01:29

## 2024-08-12 RX ADMIN — INSULIN LISPRO 16 UNITS: 100 INJECTION, SOLUTION INTRAVENOUS; SUBCUTANEOUS at 13:21

## 2024-08-12 RX ADMIN — Medication 10 ML: at 11:18

## 2024-08-12 RX ADMIN — FUROSEMIDE 20 MG: 20 TABLET ORAL at 09:14

## 2024-08-12 RX ADMIN — INSULIN LISPRO 16 UNITS: 100 INJECTION, SOLUTION INTRAVENOUS; SUBCUTANEOUS at 02:07

## 2024-08-12 RX ADMIN — METRONIDAZOLE 500 MG: 500 TABLET ORAL at 11:18

## 2024-08-12 RX ADMIN — INSULIN LISPRO 20 UNITS: 100 INJECTION, SOLUTION INTRAVENOUS; SUBCUTANEOUS at 09:14

## 2024-08-12 RX ADMIN — SODIUM CHLORIDE 1000 ML: 9 INJECTION, SOLUTION INTRAVENOUS at 01:25

## 2024-08-12 RX ADMIN — ATORVASTATIN CALCIUM 20 MG: 20 TABLET, FILM COATED ORAL at 09:14

## 2024-08-12 RX ADMIN — INSULIN GLARGINE 37 UNITS: 100 INJECTION, SOLUTION SUBCUTANEOUS at 02:08

## 2024-08-12 RX ADMIN — CEFTRIAXONE 2000 MG: 2 INJECTION, POWDER, FOR SOLUTION INTRAMUSCULAR; INTRAVENOUS at 01:29

## 2024-08-12 RX ADMIN — METFORMIN HYDROCHLORIDE 500 MG: 500 TABLET ORAL at 09:14

## 2024-08-12 RX ADMIN — NICOTINE 1 PATCH: 21 PATCH TRANSDERMAL at 11:44

## 2024-08-12 RX ADMIN — LEVOTHYROXINE SODIUM 100 MCG: 0.1 TABLET ORAL at 09:14

## 2024-08-12 RX ADMIN — SODIUM CHLORIDE 1000 ML: 9 INJECTION, SOLUTION INTRAVENOUS at 02:20

## 2024-08-12 NOTE — H&P
[facility] H&P    Patient Name: Annie Myers  : 1975  MRN: [id number]  Primary Care Physician: Provider, No Known  Date of admission: 2024     Patient Care Team:  Provider, No Known as PCP - General          Subjective   History Present Illness     Chief Complaint:   Chief Complaint   Patient presents with    Urinary Tract Infection     Pt states excessive urination and foul odor, pt finished her antibiotics and thinks it is worse.  Pt blood sugar is high and she is out of her insulin     Excessive Urination and foul discharge    Urinary Tract Infection   Associated symptoms include frequency. Pertinent negatives include no chills, flank pain, hematuria, nausea, urgency or vomiting.     ED 2024  Patient is a 48-year-old female with a history of diabetes, presents emergency department with a complaint of increased urination, foul odor to the urine, excessive thirst.  Patient reports she was seen here in the ED, she was treated for urinary tract infection, her symptoms have returned.  She does report that she recently moved from [place] to this area, she has not had a PCP appointment, she has been out of her insulin over the past couple of weeks.     Denies any fevers.  No vomiting or diarrhea.  Denies any STD concerns no vaginal discharge    Observation 2024  Patient agrees with most of the HPI above. She states that she has had a vaginal discharge with foul smell for the past month. She denies any dysuria, hematuria or burning sensation with urination. She was prescribed Amoxicillin for a possible UTI without any relief. She is surprised that she has an STI as she reports last sexual intercourse a year ago. She does report polyuria and polydipsia for the past month and noted that glucose has been very elevated at home, around 500. She is switching PCPs now due to switching insurance and has an appointment scheduled with new PCP in a couple of weeks. She is taking  metformin now but nothing else for DM as she has tried mounjaro and ozempic but could not tolerate it due to side effects. She has never seen an endocrinologist.    Review of Systems   Constitutional: Negative for chills and fever.   Endocrine: Positive for polydipsia and polyuria. Negative for polyphagia.   Gastrointestinal:  Negative for abdominal pain, nausea and vomiting.   Genitourinary:  Positive for frequency. Negative for dysuria, flank pain, hematuria, nocturia, pelvic pain and urgency.   All other systems reviewed and are negative.          Personal History     Past Medical History:   Past Medical History:   Diagnosis Date    Diabetes mellitus     Hypertension     Sleep apnea        Surgical History:    History reviewed. No pertinent surgical history.        Family History: family history is not on file. Otherwise pertinent FHx was reviewed and unremarkable.     Social History:  reports that she has been smoking cigarettes. She does not have any smokeless tobacco history on file. She reports that she does not drink alcohol and does not use drugs.      Medications:  Prior to Admission medications    Medication Sig Start Date End Date Taking? Authorizing Provider   albuterol sulfate  (90 Base) MCG/ACT inhaler Inhale 2 puffs Every 4 (Four) Hours As Needed. 4/26/24 4/26/25 Yes Jero Cardenas MD   atorvastatin (LIPITOR) 20 MG tablet Take 1 tablet by mouth Daily. 4/26/24  Yes Jero Cardenas MD   furosemide (LASIX) 20 MG tablet Take 1 tablet by mouth Daily As Needed. 4/26/24  Yes Jero Cardenas MD   ibuprofen (ADVIL,MOTRIN) 200 MG tablet Take 2 tablets by mouth 3 times a day.   Yes Jero Cardenas MD   levothyroxine (SYNTHROID, LEVOTHROID) 100 MCG tablet Take 1 tablet by mouth Daily. 4/26/24  Yes Jero Cardenas MD   metFORMIN (GLUCOPHAGE) 500 MG tablet Take 1 tablet by mouth Daily. 8/4/21 8/12/24 Yes Jero Cardenas MD   multivitamin (MULTI-VITAMIN PO) Take 1 tablet  by mouth Daily.   Yes Jero Cardenas MD   Stiolto Respimat 2.5-2.5 MCG/ACT aerosol solution inhaler Inhale 2 puffs Daily. 4/26/24  Yes Jero Cardenas MD   ascorbic acid (VITAMIN C) 500 MG tablet Take 4 tablets daily for 3 days, then take 1 tablet daily thereafter 8/20/21 8/12/24  Yoanna Littlejohn APRN   Dapagliflozin Propanediol 10 MG tablet Take 10 mg by mouth Daily. 8/4/21 8/12/24  Jero Cardenas MD   hydroCHLOROthiazide (HYDRODIURIL) 25 MG tablet Take 25 mg by mouth Daily. 8/4/21 8/12/24  Jero Cardenas MD   hydrOXYzine pamoate (VISTARIL) 50 MG capsule Take 50 mg by mouth. 7/31/21 8/12/24  Jero Cardenas MD   Insulin Glargine (Lantus SoloStar) 100 UNIT/ML injection pen Inject 10 Units under the skin into the appropriate area as directed. 8/4/21 8/12/24  Jero Cardenas MD   losartan (COZAAR) 25 MG tablet Take 25 mg by mouth Daily. 8/4/21 8/12/24  Jero Cardenas MD   methylPREDNISolone (MEDROL) 4 MG dose pack Take as directed on package instructions. 8/17/21 8/12/24  Yoanna Littlejohn APRN       Allergies:    Allergies   Allergen Reactions    Sulfa Antibiotics Rash       Objective   Objective     Vital Signs  Temp:  [97.2 °F (36.2 °C)-98.2 °F (36.8 °C)] 97.2 °F (36.2 °C)  Heart Rate:  [] 95  Resp:  [16-18] 18  BP: (130-171)/() 140/94  SpO2:  [95 %-97 %] 95 %  on   ;   Device (Oxygen Therapy): room air  Body mass index is 45.48 kg/m².    Physical Exam  Vitals and nursing note reviewed.   Constitutional:       Appearance: Normal appearance. She is obese.   HENT:      Head: Normocephalic and atraumatic.      Right Ear: External ear normal.      Left Ear: External ear normal.      Nose: Nose normal.      Mouth/Throat:      Mouth: Mucous membranes are moist.      Pharynx: Oropharynx is clear.   Eyes:      Extraocular Movements: Extraocular movements intact.   Cardiovascular:      Rate and Rhythm: Normal rate and regular rhythm.      Pulses: Normal  pulses.      Heart sounds: Normal heart sounds.   Pulmonary:      Effort: Pulmonary effort is normal.      Breath sounds: Normal breath sounds.   Abdominal:      General: Abdomen is flat. Bowel sounds are normal.      Palpations: Abdomen is soft.   Genitourinary:     Comments: Deferred. Reports significant vaginal discharge with foul smell.   Musculoskeletal:         General: Normal range of motion.      Cervical back: Normal range of motion.   Skin:     General: Skin is warm.   Neurological:      General: No focal deficit present.      Mental Status: She is alert and oriented to person, place, and time.   Psychiatric:         Mood and Affect: Mood normal.         Behavior: Behavior normal.           Results Review:  I have personally reviewed most recent lab results and radiology images and interpretations and agree with findings, most notably: CMP, CBC, acetone, UA, HC, Trichomonas.    Results from last 7 days   Lab Units 08/12/24  0453   WBC 10*3/mm3 7.48   HEMOGLOBIN g/dL 14.2   HEMATOCRIT % 42.5   PLATELETS 10*3/mm3 197     Results from last 7 days   Lab Units 08/12/24  0453 08/11/24  2356   SODIUM mmol/L 136 130*   POTASSIUM mmol/L 3.8 4.4   CHLORIDE mmol/L 102 93*   CO2 mmol/L 24.0 24.2   BUN mg/dL 15 20   CREATININE mg/dL 0.72 0.87   GLUCOSE mg/dL 380* 690*   CALCIUM mg/dL 9.1 9.5   ALK PHOS U/L  --  110   ALT (SGPT) U/L  --  30   AST (SGOT) U/L  --  21     Estimated Creatinine Clearance: 157.2 mL/min (by C-G formula based on SCr of 0.72 mg/dL).  Brief Urine Lab Results  (Last result in the past 365 days)        Color   Clarity   Blood   Leuk Est   Nitrite   Protein   CREAT   Urine HCG        08/11/24 2235               Negative       08/11/24 2235 Yellow   Turbid   Moderate (2+)   Moderate (2+)   Negative   Negative                   Microbiology Results (last 10 days)       ** No results found for the last 240 hours. **            ECG/EMG Results (most recent)       None                    No radiology  results for the last 7 days      Estimated Creatinine Clearance: 157.2 mL/min (by C-G formula based on SCr of 0.72 mg/dL).    Assessment & Plan   Assessment/Plan       Active Hospital Problems    Diagnosis  POA    Hyperglycemia [R73.9]  Yes    Trichomonas vaginalis (TV) infection [A59.01]  Yes      Resolved Hospital Problems    Diagnosis Date Resolved POA    **UTI (urinary tract infection) [N39.0] 08/12/2024 Yes       Diabetes mellitus  -Uncontrolled  Lab Results   Component Value Date    GLUCOSE 380 (H) 08/12/2024    GLUCOSE 690 (C) 08/11/2024    GLUCOSE 549 (C) 07/20/2024    GLUCOSE 222 (H) 08/17/2021   -8/11- CMP showed Na 130 and glucose 690. Otherwise, unremarkable. Anion gap wnr.  -CBC unremarkable  -Acetone negative  -In the ED patient given NS 1L bolus x 2  -Gluconmander started in the ED, continue  -Continue metformin  -Endocrinologist and DM educator consulted in the ED  -Diabetic diet  -Monitor before meals and at bedtime     Trichomonas vaginalis infection  -UA showed moderate blood and leukocytes and 3+ bacteria. No nitritres  -Positive for trichomonas  -HCG negative  -Add Chlamydia/Gonorrhea PCR, urine  -Patient received iv ceftriaxone in the ED around 1am this morning and is covered for today  -Urine culture pending. She denies any dysuria or pelvic pain.  -Suspect that symptoms are due to STI versus UTI. Will hold antibiotic for now and treat in am if urine cx positive    Hypertension  -Moderately Controlled   BP Readings from Last 1 Encounters:   08/12/24 140/94   -Not on any meds  -Continue to monitor  -Consider starting lisinopril inpatient versus following up with PCP    Hyperlipidemia  -Continue statin  -Lipid panel pending    Hypothyroidism  -Continue synthroid   -TSH/T4 pending    Fluid retention  -Continue lasix    Obesity  -BMI 45.48  -Lifestyle modifications             VTE Prophylaxis - Active VTE Prophylaxis  Mechanical:        Start        08/12/24 0147  Maintain Sequential Compression  Device  Continuous                          Select Pharmacologic VTE Prophylaxis if Desired & Appropriate      CODE STATUS:    Code Status and Medical Interventions: CPR (Attempt to Resuscitate); Full Support   Ordered at: 08/12/24 0055     Level Of Support Discussed With:    Patient     Code Status (Patient has no pulse and is not breathing):    CPR (Attempt to Resuscitate)     Medical Interventions (Patient has pulse or is breathing):    Full Support       This patient has been examined wearing personal protective equipment.     I discussed the patient's findings and my recommendations with patient and nursing staff.      Signature:Electronically signed by BARBARA Teixeira, 08/12/24, 9:04 AM EDT.

## 2024-08-12 NOTE — CONSULTS
"Diabetes Education  Assessment/Teaching    Patient Name:  Annie Myers  YOB: 1975  MRN: [id number]  Admit Date:  8/11/2024      Assessment Date:  8/12/2024  Flowsheet Row Most Recent Value   General Information     Referral From: A1c, Blood glucose, MD order  [MD consult for blood glucose >200, admission blood sugar 597, and on 8/12/2024 A1c was 16.3%.]   Height 180.3 cm (71\")   Weight 148 kg (326 lb 1 oz)   Pregnancy Assessment    Diabetes History    What type of diabetes do you have? Type 2   Length of Diabetes Diagnosis 1 - 5 years  [Patient stated that she was diagnosed in 2021]   Current DM knowledge poor   Have you had diabetes education/teaching in the past? no   Do you test your blood sugar at home? yes   Frequency of checks once  a day   Meter type bought a meter about a month ago but doesn't know name   Who performs the test? patient   Typical readings high   Have you had low blood sugar? (<70mg/dl) no   Have you had high blood sugar? (>140mg/dl) yes   How often do you have high blood sugar? frequently   When was your last high blood sugar? Admission blood sugar 597   Education Preferences    What areas of diabetes would you like to learn about? avoiding high blood sugar, avoiding low blood sugar, diabetes complications, diet information, understanding diabetes, testing my blood sugar at home, medications for diabetes   Nutrition Information    Assessment Topics    Healthy Eating - Assessment Needs education   Taking Medication - Assessment Needs education   Problem Solving - Assessment Needs education   Reducing Risk - Assessment Needs education   Monitoring - Assessment Needs education   DM Goals    Healthy Eating - Goal Today   Taking Medication - Goal Today   Problem Solving - Goal Today   Reducing Risk - Goal Today   Monitoring - Goal Today            Flowsheet Row Most Recent Value   DM Education Needs    Meter Needs meter   Meter Type Accuchek   Frequency of Testing AC " meals  [Log book given to patient with discussion on checking blood sugar 3X a day before meals and sometimes at bedtime.]   Medication Insulin, Actions, Side effects, Administration, Pen   Problem Solving Hyperglycemia, Hypoglycemia, Signs, Symptoms, Treatment   Reducing Risks A1C testing  [On 8/12/2024 A1c was 16.3%.]   Healthy Eating Basic meal plan provided   Discharge Plan Home   Motivation Moderate   Teaching Method Explanation, Discussion, Demonstration, Handouts, Teach back   Patient Response Demonstrates adequately, Verbalized understanding              Other Comments:  A1c info sheet given with discussion on A1c target and healthy blood sugar range. Patient stated she was taken off insulin 3-4 months ago and was started on Trulicity and then Mounjaro which she didn't tolerate. Patient stated she was on the Lantus insulin pens. Patient stated she has been tired, thirsty, and hasn't had much of an appetite. Explained to patient how high blood sugar affects the blood flow and overall health. Patient has cellulitis in her right lower leg. Discussed with patient the importance of good blood sugar control in the healing process. Patient stated that she has never had any diabetes education. First Steps to managing your diabetes booklet given to patient. Handouts given with discussion on 1 serving of carbs being = 15 grams, being allowed 3 servings  of carbs per meal, counting carbs, reading food labels, and meal planning. Patient stated she has been drinking a gallon of milk a day and prior to coming to hospital a 2 liter bottle of regular Big Red. Discussed with patient how sugared beverages affect the blood sugar and recommended drinking unsweetened beverages. Handouts given with discussion on types of insulin, storage of insulin, injection sites, disposal of needles, rotation of sites, and how to use an insulin pen. Patient did return demonstration of applying the pen needle to the insulin pen, priming the  pen, administering the shot into the foam pad,and holding the pen for 10 seconds after administration. Handouts given with discussion on hypoglycemia signs, symptoms, and treatment. Patient stated she didn't know about priming the pen or holding for 10 seconds after administration.  Prescriptions started in discharge orders for lancets, test strips,Accu-chek Guide Me glucometer, Lantus, Novolog, and pen needles. Patient has no further questions or concerns related to diabetes at this time.        Electronically signed by:  Gisel Henriquez RN  08/12/24 14:58 EDT

## 2024-08-12 NOTE — PLAN OF CARE
Goal Outcome Evaluation:  Plan of Care Reviewed With: patient        Progress: improving  Outcome Evaluation: Pt seen by Dr. Garcia and diabetic educator. Pt improved and states she feels better than she has in a long time. Pt to discharge home.

## 2024-08-12 NOTE — CONSULTS
Inpatient Endocrine Consult  Consultation requested by observation team for uncontrolled type 2 diabetes  Patient Care Team:  Provider, No Known as PCP - General    Chief Complaint: Uncontrolled type 2 diabetes    HPI: This is a 48-year-old female with history of type 2 diabetes diagnosed in 2021, hypertriglyceridemia came in with excessive urination and foul odor to the urine and polydipsia.  Patient was found to have significant hyperglycemia and is admitted for further evaluation and management.  Patient tells me that she was taking metformin at home.  She could not tolerate Ozempic and Mounjaro.  Her blood sugars been running more than 600 for the last few days.  She is admitting fatigue and tiredness.  No nausea and vomiting at this time.    Past Medical History:   Diagnosis Date    Diabetes mellitus     Hypertension     Sleep apnea        Social History     Socioeconomic History    Marital status: Single   Tobacco Use    Smoking status: Every Day     Current packs/day: 1.00     Types: Cigarettes   Vaping Use    Vaping status: Never Used   Substance and Sexual Activity    Alcohol use: Never    Drug use: Never    Sexual activity: Defer       History reviewed. No pertinent family history.    Allergies   Allergen Reactions    Sulfa Antibiotics Rash       ROS:   Constitutional: Admit fatigue, tiredness.    Eyes:  Denies change in visual acuity   HENT:  Denies nasal congestion or sore throat   Respiratory: Denies cough, shortness of breath.   Cardiovascular:  Denies chest pain, edema   GI:  Denies abdominal pain, nausea, vomiting.   : Admit polyuria and polydipsia  Musculoskeletal:  Denies back pain or joint pain   Integument:  Denies dry skin, rash   Neurologic:  Denies headache, focal weakness or sensory changes   Endocrine: Admit polyuria and polydipsia  Psychiatric:  Denies depression or anxiety      Vitals:    08/12/24 1017   BP: 152/100   Pulse: 93   Resp: 18   Temp: 97.9 °F (36.6 °C)   SpO2: 97%       Body mass index is 45.48 kg/m².     Physical Exam:  GEN: NAD, conversant  EYES: EOMI, PERRL  NECK: no thyromegaly  CV: RRR  PSYCH: Awake and coherent      Results Review:     I reviewed the patient's new clinical results.    Lab Results   Component Value Date    GLUCOSE 380 (H) 08/12/2024    BUN 15 08/12/2024    CREATININE 0.72 08/12/2024    EGFRIFNONA 109 08/17/2021    BCR 20.8 08/12/2024    K 3.8 08/12/2024    CO2 24.0 08/12/2024    CALCIUM 9.1 08/12/2024    ALBUMIN 3.7 08/11/2024    AST 21 08/11/2024    ALT 30 08/11/2024       Lab Results   Component Value Date    HGBA1C 16.30 (H) 08/12/2024    HGBA1C 11.68 (H) 08/15/2021     Lab Results   Component Value Date    CREATININE 0.72 08/12/2024     Results from last 7 days   Lab Units 08/12/24  1313 08/12/24  0845 08/12/24  0734 08/12/24  0409 08/12/24  0143 08/11/24  2225   GLUCOSE mg/dL 332* 353* 360* 384* 522* 597*      Latest Reference Range & Units 08/12/24 04:53   Total Cholesterol 0 - 200 mg/dL 219 (H)   HDL Cholesterol 40 - 60 mg/dL 22 (L)   LDL Cholesterol   See Comment   VLDL Cholesterol  See Comment   Triglycerides 0 - 150 mg/dL 1,483 (H)   (H): Data is abnormally high  (L): Data is abnormally low  Medication Review: Reviewed.       Current Facility-Administered Medications:     atorvastatin (LIPITOR) tablet 20 mg, 20 mg, Oral, Daily, Ellen Chacon APRN, 20 mg at 08/12/24 0914    sennosides-docusate (PERICOLACE) 8.6-50 MG per tablet 2 tablet, 2 tablet, Oral, BID PRN **AND** polyethylene glycol (MIRALAX) packet 17 g, 17 g, Oral, Daily PRN **AND** bisacodyl (DULCOLAX) EC tablet 5 mg, 5 mg, Oral, Daily PRN **AND** bisacodyl (DULCOLAX) suppository 10 mg, 10 mg, Rectal, Daily PRN, Avery Webber MD    dextrose (D50W) (25 g/50 mL) IV injection 10-50 mL, 10-50 mL, Intravenous, Q15 Min PRN, Princess Kee APRN    dextrose (GLUTOSE) oral gel 15 g, 15 g, Oral, Q15 Min PRN, Princess Kee APRN    furosemide (LASIX) tablet 20 mg, 20 mg,  Oral, Daily, Ellen Chacon S, APRN, 20 mg at 08/12/24 0914    Glucagon (GLUCAGEN) injection 1 mg, 1 mg, Intramuscular, Q15 Min PRN, Princess Kee APRN    ibuprofen (ADVIL,MOTRIN) tablet 400 mg, 400 mg, Oral, Q6H PRN, Ellen Chacon, APRN    insulin glargine (LANTUS, SEMGLEE) injection 1-200 Units, 1-200 Units, Subcutaneous, Nightly - Glucommander, Princess Kee, APRN, 37 Units at 08/12/24 0208    insulin lispro (HUMALOG/ADMELOG) injection 1-200 Units, 1-200 Units, Subcutaneous, With Meals, HS, AND Midsleep, Princess Kee APRN, 16 Units at 08/12/24 1321    insulin lispro (HUMALOG/ADMELOG) injection 1-200 Units, 1-200 Units, Subcutaneous, PRN, Princess Kee APRN, 16 Units at 08/12/24 0207    levothyroxine (SYNTHROID, LEVOTHROID) tablet 100 mcg, 100 mcg, Oral, Q AM, Ellen Chacon APRN, 100 mcg at 08/12/24 0914    melatonin tablet 5 mg, 5 mg, Oral, Nightly PRN, Avery Webber MD    metFORMIN (GLUCOPHAGE) tablet 500 mg, 500 mg, Oral, BID With Meals, Ellen Chacon S, APRN, 500 mg at 08/12/24 0914    metroNIDAZOLE (FLAGYL) tablet 500 mg, 500 mg, Oral, Q12H, Princess Kee APRN, 500 mg at 08/12/24 1118    nicotine (NICODERM CQ) 21 MG/24HR patch 1 patch, 1 patch, Transdermal, Q24H, Ellen Chacon APRN, 1 patch at 08/12/24 1144    ondansetron (ZOFRAN) injection 4 mg, 4 mg, Intravenous, Q6H PRN, Avery Webber MD    sodium chloride 0.9 % flush 10 mL, 10 mL, Intravenous, Q12H, Avery Webber MD, 10 mL at 08/12/24 1118    sodium chloride 0.9 % flush 10 mL, 10 mL, Intravenous, PRN, Avery Webber MD    sodium chloride 0.9 % infusion 40 mL, 40 mL, Intravenous, PRN, Avery Webber MD          Assessment and plan:  Diabetes mellitus type 2 with hyperglycemia: Uncontrolled with very high blood sugars and A1c more than 16%.  At this time I will recommend basal bolus insulin therapy with Lantus and Humalog.  She is currently on Glucomander subcu.   Follow blood sugars and make further adjustment.  Will get diabetes educator to see her.    Hypothyroidism: Currently on levothyroxine supplementation.    Hypertriglyceridemia: On atorvastatin, consider adding fibrate's but also hyperglycemia can cause high triglycerides so lipid panel can be checked when the blood glucose is better.    Thank you very much for the consultation.      Kali Bucio MD FACE.

## 2024-08-12 NOTE — ED NOTES
Nursing report ED to floor  Annie Myers  48 y.o.  female    HPI:   Chief Complaint   Patient presents with    Urinary Tract Infection     Pt states excessive urination and foul odor, pt finished her antibiotics and thinks it is worse.  Pt blood sugar is high and she is out of her insulin       Admitting doctor:   Avery Webber MD    Admitting diagnosis:   The primary encounter diagnosis was Acute UTI. A diagnosis of Hyperglycemia was also pertinent to this visit.    Code status:   Current Code Status       Date Active Code Status Order ID Comments User Context       8/12/2024 0055 CPR (Attempt to Resuscitate) [id number]  Princess Kee APRN ED        Question Answer    Code Status (Patient has no pulse and is not breathing) CPR (Attempt to Resuscitate)    Medical Interventions (Patient has pulse or is breathing) Full Support    Level Of Support Discussed With Patient                    Allergies:   Sulfa antibiotics    Isolation:  No active isolations     Fall Risk:  Fall Risk Assessment was completed, and patient is at low risk for falls.   Predictive Model Details         4 (Low) Factor Value    Calculated 8/12/2024 01:32 Age 48    Risk of Fall Model Active Peripheral IV Present     Respiratory Rate 18     Magnesium not on file     Number of Distinct Medication Classes administered 3     Drug Use Not Asked     Tobacco Use Current     Chloride 93 mmol/L     Elias Scale not on file     Albumin 3.7 g/dL     Cardiac Assessment X     ALT 30 U/L     Diastolic BP 90     Potassium 4.4 mmol/L     Creatinine 0.87 mg/dL     Days after Admission 0.1     Calcium 9.5 mg/dL     Total Bilirubin 0.2 mg/dL         Weight:       08/11/24  2223   Weight: (!) 148 kg (326 lb 1 oz)       Intake and Output  No intake or output data in the 24 hours ending 08/12/24 0132    Diet:   Dietary Orders (From admission, onward)       Start     Ordered    08/12/24 0045  Patient is on Glucommander  (Glucommander™ SQ Insulin -  Select Dosing Option)  Continuous        Question Answer Comment   Tray Note: Glucommander    Patient is on Glucommander: Yes        08/12/24 0044                     Most recent vitals:   Vitals:    08/12/24 0031 08/12/24 0046 08/12/24 0101 08/12/24 0118   BP: 135/84 147/88 140/88 132/90   Pulse: 111 111 107 113   Resp:       Temp:       TempSrc:       SpO2: 95% 97% 95% 96%   Weight:       Height:           Active LDAs/IV Access:   Lines, Drains & Airways       Active LDAs       Name Placement date Placement time Site Days    Peripheral IV 08/11/24 2350 Anterior;Left Forearm 08/11/24 2350  Forearm  less than 1                    Skin Condition:   Skin Assessments (last day)       None             Labs (abnormal labs have a star):   Labs Reviewed   URINALYSIS W/ CULTURE IF INDICATED - Abnormal; Notable for the following components:       Result Value    Appearance, UA Turbid (*)     Specific Gravity, UA 1.041 (*)     Glucose, UA >=1000 mg/dL (3+) (*)     Blood, UA Moderate (2+) (*)     Leuk Esterase, UA Moderate (2+) (*)     All other components within normal limits    Narrative:     In absence of clinical symptoms, the presence of pyuria, bacteria, and/or nitrites on the urinalysis result does not correlate with infection.   URINALYSIS, MICROSCOPIC ONLY - Abnormal; Notable for the following components:    RBC, UA 3-5 (*)     WBC, UA Too Numerous to Count (*)     Bacteria, UA 3+ (*)     Trichomonas, UA Moderate/2+ (*)     All other components within normal limits   COMPREHENSIVE METABOLIC PANEL - Abnormal; Notable for the following components:    Glucose 690 (*)     Sodium 130 (*)     Chloride 93 (*)     All other components within normal limits    Narrative:     GFR Normal >60  Chronic Kidney Disease <60  Kidney Failure <15     LIPASE - Abnormal; Notable for the following components:    Lipase 75 (*)     All other components within normal limits   BLOOD GAS, VENOUS - Abnormal; Notable for the following components:     pCO2, Venous 31.4 (*)     pO2, Venous 49.7 (*)     HCO3, Venous 18.2 (*)     Base Excess, Venous -6.1 (*)     O2 Saturation, Venous 84.4 (*)     CO2 Content 19.2 (*)     All other components within normal limits   MANUAL DIFFERENTIAL - Abnormal; Notable for the following components:    Monocyte % 3.0 (*)     All other components within normal limits   POCT GLUCOSE FINGERSTICK - Abnormal; Notable for the following components:    Glucose 597 (*)     All other components within normal limits   PREGNANCY, URINE - Normal   ACETONE - Normal   CBC WITH AUTO DIFFERENTIAL - Normal    Narrative:     The previously reported component NRBC is no longer being reported. Previous result was 0.0 /100 WBC (Reference Range: 0.0-0.2 /100 WBC) on 8/12/2024 at 0008 EDT.   URINE CULTURE   SCAN SLIDE   POCT GLUCOSE FINGERSTICK   POCT GLUCOSE FINGERSTICK   POCT GLUCOSE FINGERSTICK   POCT GLUCOSE FINGERSTICK   POCT GLUCOSE FINGERSTICK   CBC AND DIFFERENTIAL    Narrative:     The following orders were created for panel order CBC & Differential.  Procedure                               Abnormality         Status                     ---------                               -----------         ------                     CBC Auto Differential[[id number]]        Normal              Final result               Scan Slide[[id number]]                                       Final result                 Please view results for these tests on the individual orders.       LOC: Person, Place, Time, and Situation    Telemetry:  Observation Unit    Cardiac Monitoring Ordered: no    EKG:   No orders to display       Medications Given in the ED:   Medications   sodium chloride 0.9 % bolus 1,000 mL (has no administration in time range)   cefTRIAXone (ROCEPHIN) 2,000 mg in sodium chloride 0.9 % 100 mL MBP (2,000 mg Intravenous New Bag 8/12/24 0129)   sodium chloride 0.9 % bolus 1,000 mL (1,000 mL Intravenous New Bag 8/12/24 0125)   insulin glargine (LANTUS,  SEMGLEE) injection 1-200 Units (has no administration in time range)   insulin lispro (HUMALOG/ADMELOG) injection 1-200 Units (has no administration in time range)   insulin lispro (HUMALOG/ADMELOG) injection 1-200 Units (has no administration in time range)   dextrose (GLUTOSE) oral gel 15 g (has no administration in time range)   dextrose (D50W) (25 g/50 mL) IV injection 10-50 mL (has no administration in time range)   Glucagon (GLUCAGEN) injection 1 mg (has no administration in time range)   metroNIDAZOLE (FLAGYL) tablet 500 mg (500 mg Oral Given 8/12/24 9179)       Imaging results:  No radiology results for the last day    Social issues:   Social History     Socioeconomic History    Marital status: Single   Tobacco Use    Smoking status: Every Day     Current packs/day: 1.00     Types: Cigarettes       NIH Stroke Scale:  Interval: (not recorded)  1a. Level of Consciousness: (not recorded)  1b. LOC Questions: (not recorded)  1c. LOC Commands: (not recorded)  2. Best Gaze: (not recorded)  3. Visual: (not recorded)  4. Facial Palsy: (not recorded)  5a. Motor Arm, Left: (not recorded)  5b. Motor Arm, Right: (not recorded)  6a. Motor Leg, Left: (not recorded)  6b. Motor Leg, Right: (not recorded)  7. Limb Ataxia: (not recorded)  8. Sensory: (not recorded)  9. Best Language: (not recorded)  10. Dysarthria: (not recorded)  11. Extinction and Inattention (formerly Neglect): (not recorded)    Total (NIH Stroke Scale): (not recorded)     Additional notable assessment information:     Nursing report ED to floor:    Ayad Romeo RN   08/12/24 01:32 EDT

## 2024-08-12 NOTE — CASE MANAGEMENT/SOCIAL WORK
Social Work Assessment  [facility]     Patient Name: Annie Myers  MRN: [id number]  Today's Date: 8/12/2024    Admit Date: 8/11/2024     Discharge Plan       Row Name 08/12/24 1546       Plan    Plan From home with parents.    Patient/Family in Agreement with Plan yes    Plan Comments SW met with pt to discuss d/c planning needs.  Patient will return home with her parents.  Her goal is to get a job and find her own housing.  She receives $ 291.00 in food stamps.  Patient informed she has medical transportation as she was not aware of this benefit.  Patient reported she smokes 1 pkg of cigarettes daily.  No alcohol or illicit substance use.  Patient stated she does have periods of anxiety and depression.  No SI/HI.  She has a PCP and Counseling appointment on 9/3/24.  Patient uses [facility] in [place].  DME includes a glucometer.  Her Mother will provide transportation home at time of d/c.  SW provided housing and other useful resouces.               LAINE Mistry MSW    Phone: [phone]  Cell: [phone]  Fax: [phone]  [email or web address]

## 2024-08-12 NOTE — DISCHARGE SUMMARY
[facility]    Provider, No Known    CHIEF COMPLAINT:     Excessive Urination and foul vaginal discharge     HISTORY OF PRESENT ILLNESS:    Urinary Tract Infection       ED 08/11/2024  Patient is a 48-year-old female with a history of diabetes, presents emergency department with a complaint of increased urination, foul odor to the urine, excessive thirst.  Patient reports she was seen here in the ED, she was treated for urinary tract infection, her symptoms have returned.  She does report that she recently moved from [place] to this area, she has not had a PCP appointment, she has been out of her insulin over the past couple of weeks.     Denies any fevers.  No vomiting or diarrhea.  Denies any STD concerns no vaginal discharge     Observation 08/12/2024  Patient agrees with most of the HPI above. She states that she has had a vaginal discharge with foul smell for the past month. She denies any dysuria, hematuria or burning sensation with urination. She was prescribed Amoxicillin for a possible UTI without any relief. She is surprised that she has an STI as she reports last sexual intercourse a year ago. She does report polyuria and polydipsia for the past month and noted that glucose has been very elevated at home, around 500. She is switching PCPs now due to switching insurance and has an appointment scheduled with new PCP in a couple of weeks. She is taking metformin now but nothing else for DM as she has tried mounjaro and ozempic but could not tolerate it due to side effects. She has never seen an endocrinologist.    Patient feeling well and would like to be discharged. Dr Bucio notified via secure chat and cleared paitent for discharge. She has been seen by Diabetes Educator nurse as well. Instructed to f/u with PCP regarding urine cx and call the obs unit if any questions.    Past Medical History:   Diagnosis Date    Diabetes mellitus     Hypertension     Sleep apnea      History  reviewed. No pertinent surgical history.  History reviewed. No pertinent family history.  Social History     Tobacco Use    Smoking status: Every Day     Current packs/day: 1.00     Types: Cigarettes   Vaping Use    Vaping status: Never Used   Substance Use Topics    Alcohol use: Never    Drug use: Never     Medications Prior to Admission   Medication Sig Dispense Refill Last Dose    albuterol sulfate  (90 Base) MCG/ACT inhaler Inhale 2 puffs Every 4 (Four) Hours As Needed.   8/11/2024    atorvastatin (LIPITOR) 20 MG tablet Take 1 tablet by mouth Daily.   8/11/2024    furosemide (LASIX) 20 MG tablet Take 1 tablet by mouth Daily As Needed.       ibuprofen (ADVIL,MOTRIN) 200 MG tablet Take 2 tablets by mouth 3 times a day.   8/11/2024    levothyroxine (SYNTHROID, LEVOTHROID) 100 MCG tablet Take 1 tablet by mouth Daily.   8/11/2024    metFORMIN (GLUCOPHAGE) 500 MG tablet Take 1 tablet by mouth Daily.   8/11/2024    multivitamin (MULTI-VITAMIN PO) Take 1 tablet by mouth Daily.   8/11/2024    Stiolto Respimat 2.5-2.5 MCG/ACT aerosol solution inhaler Inhale 2 puffs Daily.   8/11/2024     Allergies:  Sulfa antibiotics      There is no immunization history on file for this patient.        REVIEW OF SYSTEMS:    ROS  Review of Systems   Constitutional: Negative for chills and fever.   Endocrine: Positive for polydipsia and polyuria. Negative for polyphagia.   Gastrointestinal:  Negative for abdominal pain, nausea and vomiting.   Genitourinary:  Positive for frequency. Negative for dysuria, flank pain, hematuria, nocturia, pelvic pain and urgency.   All other systems reviewed and are negative    Vital Signs  Temp:  [97.2 °F (36.2 °C)-98.2 °F (36.8 °C)] 98 °F (36.7 °C)  Heart Rate:  [] 91  Resp:  [16-18] 16  BP: (129-171)/() 129/88          Physical Exam:  Physical Exam  Vitals and nursing note reviewed.   Constitutional:       Appearance: Normal appearance. She is obese.   HENT:      Head: Normocephalic and  atraumatic.      Right Ear: External ear normal.      Left Ear: External ear normal.      Nose: Nose normal.      Mouth/Throat:      Mouth: Mucous membranes are moist.      Pharynx: Oropharynx is clear.   Eyes:      Extraocular Movements: Extraocular movements intact.   Cardiovascular:      Rate and Rhythm: Normal rate and regular rhythm.      Pulses: Normal pulses.      Heart sounds: Normal heart sounds.   Pulmonary:      Effort: Pulmonary effort is normal.      Breath sounds: Normal breath sounds.   Abdominal:      General: Abdomen is flat. Bowel sounds are normal.      Palpations: Abdomen is soft.   Genitourinary:     Comments: Deferred, reports foul vaginal discharge   Musculoskeletal:         General: Normal range of motion.      Cervical back: Normal range of motion.   Skin:     General: Skin is warm.   Neurological:      General: No focal deficit present.      Mental Status: She is alert and oriented to person, place, and time.   Psychiatric:         Mood and Affect: Mood normal.         Behavior: Behavior normal.         Emotional Behavior:    Normal   Debilities:   None  Results Review:    I reviewed the patient's new clinical results.  Lab Results (most recent)       Procedure Component Value Units Date/Time    POC Glucose With Meals, HS, AND Midsleep [[id number]]  (Abnormal) Collected: 08/12/24 1313    Specimen: Blood Updated: 08/12/24 1314     Glucose 332 mg/dL      Comment: Serial Number: [id number]:  [id number]       Chlamydia trachomatis, Neisseria gonorrhoeae, PCR - Urine, Urine, Clean Catch [[id number]]  (Normal) Collected: 08/12/24 1024    Specimen: Urine, Clean Catch Updated: 08/12/24 1207     Chlamydia DNA by PCR Not Detected     Neisseria gonorrhoeae by PCR Not Detected    Hemoglobin A1c [[id number]]  (Abnormal) Collected: 08/12/24 0453    Specimen: Blood from Arm, Right Updated: 08/12/24 1203     Hemoglobin A1C 16.30 %     Lipid Panel [[id number]]  (Abnormal) Collected: 08/12/24 0453     Specimen: Blood from Arm, Right Updated: 08/12/24 1054     Total Cholesterol 219 mg/dL      Triglycerides 1,483 mg/dL      HDL Cholesterol 22 mg/dL      Comment: Triglyceride result >1200 mg/dL. HDL result may be affected.  Calculated results will not be reported.         LDL Cholesterol  --     Comment: Unable to calculate        VLDL Cholesterol --     Comment: Unable to calculate        LDL/HDL Ratio --     Comment: Unable to calculate       Narrative:      Cholesterol Reference Ranges  (U.S. Department of Health and Human Services ATP III Classifications)    Desirable          <200 mg/dL  Borderline High    200-239 mg/dL  High Risk          >240 mg/dL      Triglyceride Reference Ranges  (U.S. Department of Health and Human Services ATP III Classifications)    Normal           <150 mg/dL  Borderline High  150-199 mg/dL  High             200-499 mg/dL  Very High        >500 mg/dL    HDL Reference Ranges  (U.S. Department of Health and Human Services ATP III Classifications)    Low     <40 mg/dl (major risk factor for CHD)  High    >60 mg/dl ('negative' risk factor for CHD)        LDL Reference Ranges  (U.S. Department of Health and Human Services ATP III Classifications)    Optimal          <100 mg/dL  Near Optimal     100-129 mg/dL  Borderline High  130-159 mg/dL  High             160-189 mg/dL  Very High        >189 mg/dL    LDL Cholesterol, Direct [[id number]] Collected: 08/12/24 0453    Specimen: Blood from Arm, Right Updated: 08/12/24 1054    Urine Culture - Urine, Urine, Clean Catch [[id number]]  (Normal) Collected: 08/11/24 2235    Specimen: Urine, Clean Catch Updated: 08/12/24 1023     Urine Culture Culture in progress    TSH [[id number]]  (Normal) Collected: 08/12/24 0453    Specimen: Blood from Arm, Right Updated: 08/12/24 1013     TSH 1.930 uIU/mL     T4, Free [[id number]]  (Normal) Collected: 08/12/24 0453    Specimen: Blood from Arm, Right Updated: 08/12/24 1013     Free T4 0.95 ng/dL     POC Glucose With  Meals, HS, AND Midsleep [[id number]]  (Abnormal) Collected: 08/12/24 0845    Specimen: Blood Updated: 08/12/24 0847     Glucose 353 mg/dL      Comment: Serial Number: [id number]:  [id number]       CBC Auto Differential [[id number]]  (Normal) Collected: 08/12/24 0453    Specimen: Blood from Arm, Right Updated: 08/12/24 0700     WBC 7.48 10*3/mm3      RBC 4.74 10*6/mm3      Hemoglobin 14.2 g/dL      Hematocrit 42.5 %      MCV 89.7 fL      MCH 30.0 pg      MCHC 33.4 g/dL      RDW 12.9 %      RDW-SD 42.3 fl      MPV 11.0 fL      Platelets 197 10*3/mm3     Narrative:      The previously reported component NRBC is no longer being reported. Previous result was 0.0 /100 WBC (Reference Range: 0.0-0.2 /100 WBC) on 8/12/2024 at 0520 EDT.    Scan Slide [[id number]] Collected: 08/12/24 0453    Specimen: Blood from Arm, Right Updated: 08/12/24 0700     Scan Slide --     Comment: See Manual Differential Results       Manual Differential [[id number]]  (Abnormal) Collected: 08/12/24 0453    Specimen: Blood from Arm, Right Updated: 08/12/24 0700     Neutrophil % 44.0 %      Lymphocyte % 44.0 %      Monocyte % 9.0 %      Eosinophil % 2.0 %      Atypical Lymphocyte % 1.0 %      Neutrophils Absolute 3.29 10*3/mm3      Lymphocytes Absolute 3.37 10*3/mm3      Monocytes Absolute 0.67 10*3/mm3      Eosinophils Absolute 0.15 10*3/mm3      Anisocytosis Slight/1+     Poikilocytes Slight/1+     WBC Morphology Normal     Large Platelets Slight/1+    Basic Metabolic Panel [[id number]]  (Abnormal) Collected: 08/12/24 0453    Specimen: Blood from Arm, Right Updated: 08/12/24 0540     Glucose 380 mg/dL      BUN 15 mg/dL      Creatinine 0.72 mg/dL      Sodium 136 mmol/L      Potassium 3.8 mmol/L      Comment: Specimen hemolyzed.  Result may be falsely elevated.        Chloride 102 mmol/L      CO2 24.0 mmol/L      Calcium 9.1 mg/dL      BUN/Creatinine Ratio 20.8     Anion Gap 10.0 mmol/L      eGFR 104.6 mL/min/1.73     Narrative:      GFR Normal  >60  Chronic Kidney Disease <60  Kidney Failure <15      Acetone [[id number]]  (Normal) Collected: 08/11/24 2356    Specimen: Blood Updated: 08/12/24 0038     Acetone Negative    Blood Gas, Venous - [[id number]]  (Abnormal) Collected: 08/12/24 0028    Specimen: Venous Blood Updated: 08/12/24 0031     pH, Venous 7.372 pH Units      pCO2, Venous 31.4 mm Hg      pO2, Venous 49.7 mm Hg      HCO3, Venous 18.2 mmol/L      Base Excess, Venous -6.1 mmol/L      Comment: Serial Number: [id number]:  [id number]        O2 Saturation, Venous 84.4 %      CO2 Content 19.2 mmol/L      Barometric Pressure for Blood Gas --     Comment: N/A        Modality Room Air     FIO2 21 %     Comprehensive Metabolic Panel [[id number]]  (Abnormal) Collected: 08/11/24 2356    Specimen: Blood Updated: 08/12/24 0029     Glucose 690 mg/dL      BUN 20 mg/dL      Creatinine 0.87 mg/dL      Sodium 130 mmol/L      Potassium 4.4 mmol/L      Comment: Slight hemolysis detected by analyzer. Result may be falsely elevated.        Chloride 93 mmol/L      CO2 24.2 mmol/L      Calcium 9.5 mg/dL      Total Protein 7.0 g/dL      Albumin 3.7 g/dL      ALT (SGPT) 30 U/L      AST (SGOT) 21 U/L      Alkaline Phosphatase 110 U/L      Total Bilirubin 0.2 mg/dL      Globulin 3.3 gm/dL      A/G Ratio 1.1 g/dL      BUN/Creatinine Ratio 23.0     Anion Gap 12.8 mmol/L      eGFR 83.3 mL/min/1.73     Narrative:      GFR Normal >60  Chronic Kidney Disease <60  Kidney Failure <15      Lipase [[id number]]  (Abnormal) Collected: 08/11/24 2356    Specimen: Blood Updated: 08/12/24 0026     Lipase 75 U/L     CBC & Differential [[id number]] Collected: 08/11/24 2356    Specimen: Blood Updated: 08/12/24 0021    Narrative:      The following orders were created for panel order CBC & Differential.  Procedure                               Abnormality         Status                     ---------                               -----------         ------                     CBC Auto  Differential[[id number]]        Normal              Final result               Scan Slide[[id number]]                                       Final result                 Please view results for these tests on the individual orders.    CBC Auto Differential [[id number]]  (Normal) Collected: 08/11/24 2356    Specimen: Blood Updated: 08/12/24 0021     WBC 6.99 10*3/mm3      RBC 4.67 10*6/mm3      Hemoglobin 14.1 g/dL      Hematocrit 42.4 %      MCV 90.8 fL      MCH 30.2 pg      MCHC 33.3 g/dL      RDW 13.0 %      RDW-SD 42.9 fl      MPV 11.0 fL      Platelets 191 10*3/mm3     Narrative:      The previously reported component NRBC is no longer being reported. Previous result was 0.0 /100 WBC (Reference Range: 0.0-0.2 /100 WBC) on 8/12/2024 at 0008 EDT.    Scan Slide [[id number]] Collected: 08/11/24 2356    Specimen: Blood Updated: 08/12/24 0021     Scan Slide --     Comment: See Manual Differential Results       Manual Differential [[id number]]  (Abnormal) Collected: 08/11/24 2356    Specimen: Blood Updated: 08/12/24 0021     Neutrophil % 64.0 %      Lymphocyte % 26.0 %      Monocyte % 3.0 %      Eosinophil % 3.0 %      Bands %  3.0 %      Atypical Lymphocyte % 1.0 %      Neutrophils Absolute 4.68 10*3/mm3      Lymphocytes Absolute 1.89 10*3/mm3      Monocytes Absolute 0.21 10*3/mm3      Eosinophils Absolute 0.21 10*3/mm3      Dacrocytes Mod/2+     Poikilocytes Slight/1+     WBC Morphology Normal     Platelet Estimate Adequate    Pregnancy, Urine - Urine, Clean Catch [[id number]]  (Normal) Collected: 08/11/24 2235    Specimen: Urine, Clean Catch Updated: 08/12/24 0008     HCG, Urine QL Negative    Urinalysis, Microscopic Only - Urine, Clean Catch [[id number]]  (Abnormal) Collected: 08/11/24 2235    Specimen: Urine, Clean Catch Updated: 08/11/24 2327     RBC, UA 3-5 /HPF      WBC, UA Too Numerous to Count /HPF      Bacteria, UA 3+ /HPF      Squamous Epithelial Cells, UA 0-2 /HPF      Hyaline Casts, UA 0-2 /LPF      Trichomonas,  UA Moderate/2+ /HPF      Methodology Manual Light Microscopy    Urinalysis With Culture If Indicated - Urine, Clean Catch [[id number]]  (Abnormal) Collected: 08/11/24 2235    Specimen: Urine, Clean Catch Updated: 08/11/24 2256     Color, UA Yellow     Appearance, UA Turbid     pH, UA 5.5     Specific Gravity, UA 1.041     Glucose, UA >=1000 mg/dL (3+)     Ketones, UA Negative     Bilirubin, UA Negative     Blood, UA Moderate (2+)     Protein, UA Negative     Leuk Esterase, UA Moderate (2+)     Nitrite, UA Negative     Urobilinogen, UA 0.2 E.U./dL    Narrative:      In absence of clinical symptoms, the presence of pyuria, bacteria, and/or nitrites on the urinalysis result does not correlate with infection.            Imaging Results (Most Recent)       None          reviewed    ECG/EMG Results (most recent)       None          reviewed            Microbiology Results (last 10 days)       Procedure Component Value - Date/Time    Chlamydia trachomatis, Neisseria gonorrhoeae, PCR - Urine, Urine, Clean Catch [[id number]]  (Normal) Collected: 08/12/24 1024    Lab Status: Final result Specimen: Urine, Clean Catch Updated: 08/12/24 1207     Chlamydia DNA by PCR Not Detected     Neisseria gonorrhoeae by PCR Not Detected    Urine Culture - Urine, Urine, Clean Catch [[id number]]  (Normal) Collected: 08/11/24 2235    Lab Status: Preliminary result Specimen: Urine, Clean Catch Updated: 08/12/24 1023     Urine Culture Culture in progress            Assessment & Plan     Hyperglycemia    Trichomonas vaginalis (TV) infection          Diabetes mellitus  -Uncontrolled        Lab Results   Component Value Date     GLUCOSE 380 (H) 08/12/2024     GLUCOSE 690 (C) 08/11/2024     GLUCOSE 549 (C) 07/20/2024     GLUCOSE 222 (H) 08/17/2021   -8/11- CMP showed Na 130 and glucose 690. Otherwise, unremarkable. Anion gap wnr.  -CBC unremarkable  -Acetone negative  -A1C 16.0  -In the ED patient given NS 1L bolus x 2  -Gluconmander started in the ED,  continue  -Continue metformin  -Endocrinologist and DM educator consulted in the ED  -Diabetic diet  -Monitor before meals and at bedtime   -Endocrinologist recommended 37 units of lantus (as recommended by lorrie) and 12 units of humalog TID  -F/u with endocrinologist in 4 weeks     Trichomonas vaginalis infection  -UA showed moderate blood and leukocytes and 3+ bacteria. No nitritres  -Positive for trichomonas  -HCG negative  -Add Chlamydia/Gonorrhea PCR, urine  -Patient received iv ceftriaxone in the ED around 1am this morning and is covered for today  -Urine culture pending. She denies any dysuria or pelvic pain.  -Suspect that symptoms are due to STI versus UTI. Will hold antibiotic for now and treat in am if urine cx positive     Hypertension  -Moderately Controlled       BP Readings from Last 1 Encounters:   08/12/24 140/94   -Not on any meds  -Continue to monitor  -Monitor BP to discuss with PCP next appt     Hyperlipidemia  -Continue statin  -Lipid panel showed total chol 219, HDL 22 and trig 1483     Hypothyroidism  -Continue synthroid   -TSH/T4 wnr     Fluid retention  -Continue lasix     Obesity  -BMI 45.48  -Lifestyle modifications              I discussed the patients findings and my recommendations with patient and nursing staff.     Discharge Diagnosis:      Hyperglycemia    Trichomonas vaginalis (TV) infection      Hospital Course  Patient is a 48 y.o. female presented with increased urination and vaginal discharge as noted on HPI above.  CBC, acetone, TSH, T4 unremarkable.  CMP showed glucose 690.  Patient received IV fluid bolus in the ED and was started on Glucomander.  Endocrinologist and diabetes educator were consulted.  UA was negative for nitrites and positive for bacteria and trichomonas.  Chlamydia/gonorrhea negative.  Patient denies any dysuria or burning with urination but reports increased vaginal foul discharge.  She received IV ceftriaxone x 1 and was started on Flagyl 500 mg  twice daily for 7 days.  Symptoms likely related to STI versus UTI.  Urine cultures pending results.  Lipid panel showed triglycerides 1483 and total cholesterol 219.  Patient is currently on statin.  Endocrinologist recommended 37 units of Lantus and 12 units of Humalog 3 times daily for now.  Follow-up in his office in 4 weeks. At this time, patient felt to be in good condition for discharge with close follow up with PCP and endocrinologist. Instructed to take all medications as prescribed and to return to ED if any concerning signs/symptoms. All test/lab results were discussed with patient. All questions were answered and patient verbalizes understanding.       Past Medical History:     Past Medical History:   Diagnosis Date    Diabetes mellitus     Hypertension     Sleep apnea        Past Surgical History:   History reviewed. No pertinent surgical history.    Social History:   Social History     Socioeconomic History    Marital status: Single   Tobacco Use    Smoking status: Every Day     Current packs/day: 1.00     Types: Cigarettes   Vaping Use    Vaping status: Never Used   Substance and Sexual Activity    Alcohol use: Never    Drug use: Never    Sexual activity: Defer       Procedures Performed         Consults:   Consults       Date and Time Order Name Status Description    8/12/2024  1:46 AM Inpatient Endocrinology Consult              Condition on Discharge:     Stable    Discharge Disposition  Home or Self Care    Discharge Medications     Discharge Medications        New Medications        Instructions Start Date   Accu-Chek Guide Me w/Device kit   1 kit, Does not apply, 3 Times Daily Before Meals, Dx code: E11.65  NDC [address]-06  Bin#: [id number] Group #: [id number]  ID #: [id number]  Issuer #: [id number])      Accu-Chek Guide test strip  Generic drug: glucose blood   1 each, Other, 3 Times Daily Before Meals, Use as instructed Dx code: E11.65      Accu-Chek Softclix Lancets lancets   1 each, Other, 3 Times  Daily Before Meals, Use as instructed Dx code: E11.65      Insulin Glargine 100 UNIT/ML injection pen  Commonly known as: LANTUS SOLOSTAR   37 Units, Subcutaneous, Nightly, Dx code: E11.65      metroNIDAZOLE 500 MG tablet  Commonly known as: FLAGYL   500 mg, Oral, Every 12 Hours Scheduled      NovoLOG FlexPen 100 UNIT/ML solution pen-injector sc pen  Generic drug: insulin aspart   12 Units, Subcutaneous, 3 Times Daily With Meals, Dx code: E11.65      Pen Needles 32G X 4 MM misc   1 each, Does not apply, 4 Times Daily Before Meals & Nightly, Dx code: E11.65             Continue These Medications        Instructions Start Date   albuterol sulfate  (90 Base) MCG/ACT inhaler  Commonly known as: PROVENTIL HFA;VENTOLIN HFA;PROAIR HFA   2 puffs, Inhalation, Every 4 Hours PRN      atorvastatin 20 MG tablet  Commonly known as: LIPITOR   20 mg, Oral, Daily      furosemide 20 MG tablet  Commonly known as: LASIX   20 mg, Oral, Daily PRN      ibuprofen 200 MG tablet  Commonly known as: ADVIL,MOTRIN   400 mg, Oral, 3 times daily      levothyroxine 100 MCG tablet  Commonly known as: SYNTHROID, LEVOTHROID   100 mcg, Oral, Daily      metFORMIN 500 MG tablet  Commonly known as: GLUCOPHAGE   500 mg, Oral, Daily      multivitamin tablet tablet   1 tablet, Oral, Daily      Stiolto Respimat 2.5-2.5 MCG/ACT aerosol solution inhaler  Generic drug: tiotropium bromide-olodaterol   2 Inhalations, Inhalation, Daily               Discharge Diet:   Diet Instructions       Diet: Diabetic Diets; Consistent Carbohydrate; Regular (IDDSI 7); Thin (IDDSI 0)      Discharge Diet: Diabetic Diets    Diabetic Diet: Consistent Carbohydrate    Texture: Regular (IDDSI 7)    Fluid Consistency: Thin (IDDSI 0)            Activity at Discharge:     Follow-up Appointments  No future appointments.  Additional Instructions for the Follow-ups that You Need to Schedule       Discharge Follow-up with PCP   As directed       Currently Documented PCP:    Provider,  No Known    PCP Phone Number:    [phone]     Follow Up Details: 5-7 days        Discharge Follow-up with Specified Provider: Dr Bucio; 1 Month   As directed      To: Dr Bucio   Follow Up: 1 Month                Test Results Pending at Discharge  Pending Labs       Order Current Status    LDL Cholesterol, Direct In process    Urine Culture - Urine, Urine, Clean Catch Preliminary result             Risk for Readmission (LACE) Score: 3 (8/12/2024  6:00 AM)      Greater than 30 minutes spent in discharge activities for this patient    Signature:Electronically signed by BARBARA Teixeira, 08/12/24, 2:43 PM EDT.

## 2024-08-12 NOTE — PLAN OF CARE
"  Problem: Adult Inpatient Plan of Care  Goal: Optimal Comfort and Wellbeing  Intervention: Monitor Pain and Promote Comfort  Recent Flowsheet Documentation  Taken 8/12/2024 0308 by Osiris Valera RN  Pain Management Interventions:   relaxation techniques promoted   care clustered   pillow support provided  Goal: Readiness for Transition of Care  Intervention: Mutually Develop Transition Plan  Recent Flowsheet Documentation  Taken 8/12/2024 0314 by Osiris Valera, RN  Transportation Anticipated: family or friend will provide  Transportation Concerns: no car  Patient/Family Anticipated Services at Transition: none  Patient/Family Anticipates Transition to: home with family  Taken 8/12/2024 0312 by Osiris Valera, RN  Equipment Currently Used at Home: none   Goal Outcome Evaluation:         Pt new admit for uti. Pt has c/o foul order and increased urinary frequency.   Pt also has 2t rle edema. Pt reports that she has not taken her lasix \"in a long time\" because it makes her \"prr too  much.\" Pt also reports not taking insulin. Pt reports dr switched from lantus to ozempic and mournjaro. She reports staying above 300 with both of those medications and she is unsure of why she was switched in the first placed. She also reports she stopped using medications due to adverse effects. Pt to have diabetes edu consult and endocrinology consult. No further orders at this time. Care continues.                                      "

## 2024-08-12 NOTE — ED PROVIDER NOTES
Subjective   Chief Complaint   Patient presents with    Urinary Tract Infection     Pt states excessive urination and foul odor, pt finished her antibiotics and thinks it is worse.  Pt blood sugar is high and she is out of her insulin       History of Present Illness  Patient is a 48-year-old female with a history of diabetes, presents emergency department with a complaint of increased urination, foul odor to the urine, excessive thirst.  Patient reports she was seen here in the ED, she was treated for urinary tract infection, her symptoms have returned.  She does report that she recently moved from [place] to this area, she has not had a PCP appointment, she has been out of her insulin over the past couple of weeks.    Denies any fevers.  No vomiting or diarrhea.  Denies any STD concerns no vaginal discharge  Review of Systems  Per HPI  Past Medical History:   Diagnosis Date    Diabetes mellitus     Hypertension     Sleep apnea        Allergies   Allergen Reactions    Sulfa Antibiotics Rash       No past surgical history on file.    No family history on file.    Social History     Socioeconomic History    Marital status: Single   Tobacco Use    Smoking status: Every Day     Current packs/day: 1.00     Types: Cigarettes           Objective   Physical Exam  Vitals and nursing note reviewed.   Constitutional:       Appearance: Normal appearance. She is not ill-appearing or toxic-appearing.   HENT:      Head: Normocephalic and atraumatic.      Nose: Nose normal.      Mouth/Throat:      Mouth: Mucous membranes are moist.      Pharynx: Oropharynx is clear.   Eyes:      Conjunctiva/sclera: Conjunctivae normal.      Pupils: Pupils are equal, round, and reactive to light.   Cardiovascular:      Rate and Rhythm: Normal rate and regular rhythm.      Heart sounds: Normal heart sounds. No murmur heard.     No friction rub. No gallop.   Pulmonary:      Effort: Pulmonary effort is normal.      Breath sounds: Normal breath  sounds.   Abdominal:      General: Bowel sounds are normal.      Palpations: Abdomen is soft.      Tenderness: There is no abdominal tenderness. There is no guarding or rebound.   Musculoskeletal:         General: Normal range of motion.      Cervical back: Normal range of motion and neck supple.      Right lower leg: No edema.      Left lower leg: No edema.   Skin:     General: Skin is warm and dry.      Capillary Refill: Capillary refill takes less than 2 seconds.      Findings: No rash.   Neurological:      General: No focal deficit present.      Mental Status: She is alert and oriented to person, place, and time.   Psychiatric:         Mood and Affect: Mood normal.         Behavior: Behavior normal.         Procedures           ED Course      Vitals:    08/12/24 0118   BP: 132/90   Pulse: 113   Resp:    Temp:    SpO2: 96%     Medications   sodium chloride 0.9 % bolus 1,000 mL (1,000 mL Intravenous New Bag 8/12/24 0220)   insulin glargine (LANTUS, SEMGLEE) injection 1-200 Units (37 Units Subcutaneous Given 8/12/24 0208)   insulin lispro (HUMALOG/ADMELOG) injection 1-200 Units (has no administration in time range)   insulin lispro (HUMALOG/ADMELOG) injection 1-200 Units (16 Units Subcutaneous Given 8/12/24 0207)   dextrose (GLUTOSE) oral gel 15 g (has no administration in time range)   dextrose (D50W) (25 g/50 mL) IV injection 10-50 mL (has no administration in time range)   Glucagon (GLUCAGEN) injection 1 mg (has no administration in time range)   sodium chloride 0.9 % flush 10 mL (has no administration in time range)   sodium chloride 0.9 % flush 10 mL (has no administration in time range)   sodium chloride 0.9 % infusion 40 mL (has no administration in time range)   ondansetron (ZOFRAN) injection 4 mg (has no administration in time range)   melatonin tablet 5 mg (has no administration in time range)   sennosides-docusate (PERICOLACE) 8.6-50 MG per tablet 2 tablet (has no administration in time range)     And    polyethylene glycol (MIRALAX) packet 17 g (has no administration in time range)     And   bisacodyl (DULCOLAX) EC tablet 5 mg (has no administration in time range)     And   bisacodyl (DULCOLAX) suppository 10 mg (has no administration in time range)   metroNIDAZOLE (FLAGYL) tablet 500 mg (500 mg Oral Given 8/12/24 0129)   cefTRIAXone (ROCEPHIN) 2,000 mg in sodium chloride 0.9 % 100 mL MBP (0 mg Intravenous Stopped 8/12/24 0159)   sodium chloride 0.9 % bolus 1,000 mL (1,000 mL Intravenous New Bag 8/12/24 0125)     Lab Results (last 24 hours)       Procedure Component Value Units Date/Time    POC Glucose Once [[id number]]  (Abnormal) Collected: 08/11/24 2225    Specimen: Blood Updated: 08/11/24 2228     Glucose 597 mg/dL      Comment: Serial Number: [id number]:  [id number]       Urinalysis With Culture If Indicated - Urine, Clean Catch [[id number]]  (Abnormal) Collected: 08/11/24 2235    Specimen: Urine, Clean Catch Updated: 08/11/24 2256     Color, UA Yellow     Appearance, UA Turbid     pH, UA 5.5     Specific Gravity, UA 1.041     Glucose, UA >=1000 mg/dL (3+)     Ketones, UA Negative     Bilirubin, UA Negative     Blood, UA Moderate (2+)     Protein, UA Negative     Leuk Esterase, UA Moderate (2+)     Nitrite, UA Negative     Urobilinogen, UA 0.2 E.U./dL    Narrative:      In absence of clinical symptoms, the presence of pyuria, bacteria, and/or nitrites on the urinalysis result does not correlate with infection.    Urinalysis, Microscopic Only - Urine, Clean Catch [[id number]]  (Abnormal) Collected: 08/11/24 2235    Specimen: Urine, Clean Catch Updated: 08/11/24 2327     RBC, UA 3-5 /HPF      WBC, UA Too Numerous to Count /HPF      Bacteria, UA 3+ /HPF      Squamous Epithelial Cells, UA 0-2 /HPF      Hyaline Casts, UA 0-2 /LPF      Trichomonas, UA Moderate/2+ /HPF      Methodology Manual Light Microscopy    Urine Culture - Urine, Urine, Clean Catch [[id number]] Collected: 08/11/24 2235    Specimen: Urine,  Clean Catch Updated: 08/11/24 2327    Pregnancy, Urine - Urine, Clean Catch [[id number]]  (Normal) Collected: 08/11/24 2235    Specimen: Urine, Clean Catch Updated: 08/12/24 0008     HCG, Urine QL Negative    CBC & Differential [[id number]] Collected: 08/11/24 2356    Specimen: Blood Updated: 08/12/24 0021    Narrative:      The following orders were created for panel order CBC & Differential.  Procedure                               Abnormality         Status                     ---------                               -----------         ------                     CBC Auto Differential[[id number]]        Normal              Final result               Scan Slide[[id number]]                                       Final result                 Please view results for these tests on the individual orders.    Comprehensive Metabolic Panel [[id number]]  (Abnormal) Collected: 08/11/24 2356    Specimen: Blood Updated: 08/12/24 0029     Glucose 690 mg/dL      BUN 20 mg/dL      Creatinine 0.87 mg/dL      Sodium 130 mmol/L      Potassium 4.4 mmol/L      Comment: Slight hemolysis detected by analyzer. Result may be falsely elevated.        Chloride 93 mmol/L      CO2 24.2 mmol/L      Calcium 9.5 mg/dL      Total Protein 7.0 g/dL      Albumin 3.7 g/dL      ALT (SGPT) 30 U/L      AST (SGOT) 21 U/L      Alkaline Phosphatase 110 U/L      Total Bilirubin 0.2 mg/dL      Globulin 3.3 gm/dL      A/G Ratio 1.1 g/dL      BUN/Creatinine Ratio 23.0     Anion Gap 12.8 mmol/L      eGFR 83.3 mL/min/1.73     Narrative:      GFR Normal >60  Chronic Kidney Disease <60  Kidney Failure <15      Lipase [[id number]]  (Abnormal) Collected: 08/11/24 2356    Specimen: Blood Updated: 08/12/24 0026     Lipase 75 U/L     Acetone [[id number]]  (Normal) Collected: 08/11/24 2356    Specimen: Blood Updated: 08/12/24 0038     Acetone Negative    CBC Auto Differential [[id number]]  (Normal) Collected: 08/11/24 2356    Specimen: Blood Updated: 08/12/24 0021     WBC 6.99  10*3/mm3      RBC 4.67 10*6/mm3      Hemoglobin 14.1 g/dL      Hematocrit 42.4 %      MCV 90.8 fL      MCH 30.2 pg      MCHC 33.3 g/dL      RDW 13.0 %      RDW-SD 42.9 fl      MPV 11.0 fL      Platelets 191 10*3/mm3     Narrative:      The previously reported component NRBC is no longer being reported. Previous result was 0.0 /100 WBC (Reference Range: 0.0-0.2 /100 WBC) on 8/12/2024 at 0008 T.    Scan Slide [[id number]] Collected: 08/11/24 2356    Specimen: Blood Updated: 08/12/24 0021     Scan Slide --     Comment: See Manual Differential Results       Manual Differential [[id number]]  (Abnormal) Collected: 08/11/24 2356    Specimen: Blood Updated: 08/12/24 0021     Neutrophil % 64.0 %      Lymphocyte % 26.0 %      Monocyte % 3.0 %      Eosinophil % 3.0 %      Bands %  3.0 %      Atypical Lymphocyte % 1.0 %      Neutrophils Absolute 4.68 10*3/mm3      Lymphocytes Absolute 1.89 10*3/mm3      Monocytes Absolute 0.21 10*3/mm3      Eosinophils Absolute 0.21 10*3/mm3      Dacrocytes Mod/2+     Poikilocytes Slight/1+     WBC Morphology Normal     Platelet Estimate Adequate    Blood Gas, Venous - [[id number]]  (Abnormal) Collected: 08/12/24 0028    Specimen: Venous Blood Updated: 08/12/24 0031     pH, Venous 7.372 pH Units      pCO2, Venous 31.4 mm Hg      pO2, Venous 49.7 mm Hg      HCO3, Venous 18.2 mmol/L      Base Excess, Venous -6.1 mmol/L      Comment: Serial Number: [id number]:  [id number]        O2 Saturation, Venous 84.4 %      CO2 Content 19.2 mmol/L      Barometric Pressure for Blood Gas --     Comment: N/A        Modality Room Air     FIO2 21 %     POC Glucose Once [[id number]]  (Abnormal) Collected: 08/12/24 0143    Specimen: Blood Updated: 08/12/24 0145     Glucose 522 mg/dL      Comment: Serial Number: [id number]:  [id number]                                                    Medical Decision Making  Problems Addressed:  Acute UTI: complicated acute illness or injury  Hyperglycemia: complicated acute  illness or injury    Amount and/or Complexity of Data Reviewed  Labs: ordered.  ECG/medicine tests: ordered.    Risk  OTC drugs.  Prescription drug management.  Decision regarding hospitalization.        Imaging: No radiology results for the last day      Pt was Placed on appropriate monitoring.  Differential diagnoses considered for patient presentation, this list is not all inclusive of diagnoses considered: UTI, vaginitis, DKA, HHS  Patient presents to the ED for the above complaint, underwent the above, exam and workup notable for hyperglycemia, no signs of DKA.  Her urine is concerning for UTI, she also has trichomonas noted.  She no other concerns for STD.  Will initiate her on Rocephin, I have ordered hydration, Glucomander subcu insulin protocol, we will treat her trichomonas in addition to her UTI placed in ED observation for glucose management, endocrinology consult as she has not had any outpatient follow-up does not currently have a primary care provider, and diabetes education.      Note Disclaimer: At [facility], we believe that sharing information builds trust and better relationships. You are receiving this note because you recently visited [facility]. It is possible you will see health information before a provider has talked with you about it. This kind of information can be easy to misunderstand. To help you fully understand what it means for your health, we urge you to discuss this note with your provider  Note dictated utilizing Dragon Dictation.  Appropriate PPE worn during patient interactions.      Final diagnoses:   Acute UTI   Hyperglycemia       ED Disposition  ED Disposition       ED Disposition   Decision to Admit    Condition   --    Comment   --               No follow-up provider specified.       Medication List      No changes were made to your prescriptions during this visit.            Princess Kee, APRN  08/12/24 0225

## 2024-08-13 ENCOUNTER — TELEPHONE (OUTPATIENT)
Dept: DIABETES SERVICES | Facility: HOSPITAL | Age: 49
End: 2024-08-13
Payer: MEDICAID

## 2024-08-13 LAB
BACTERIA SPEC AEROBE CULT: ABNORMAL
BACTERIA SPEC AEROBE CULT: ABNORMAL

## 2024-08-13 NOTE — TELEPHONE ENCOUNTER
Pt returned educator call. Educator returned pt's call and left second voice mail to contact [phone].

## 2024-08-14 ENCOUNTER — TELEPHONE (OUTPATIENT)
Dept: DIABETES SERVICES | Facility: HOSPITAL | Age: 49
End: 2024-08-14
Payer: MEDICAID

## 2024-08-14 NOTE — TELEPHONE ENCOUNTER
Follow-up call to patient to see if able to get prescriptions. Patient stated the insurance would not cover for a new glucometer or test strips. Patient stated she has the Accu-chek Guide Me glucometer. Explained to patient that she could go to [facility] and get a meter for $9 and a box of 50 test strips for $9. Patient stated that she was currently at [facility] and would buy them right  now. Patient has no further questions or concerns related to diabetes at this time.

## 2024-10-21 ENCOUNTER — TELEPHONE (OUTPATIENT)
Dept: ENDOCRINOLOGY | Facility: CLINIC | Age: 49
End: 2024-10-21

## 2024-10-21 NOTE — TELEPHONE ENCOUNTER
[facility] staff attempted to follow warm transfer process and was unsuccessful     Caller: Annie Myers    Relationship to patient: Self    Best call back number: [phone]; CALL IN AFTERNOON; OK TO [facility]    Patient is needing: DR REBOLLEDO DID A HOSPITAL CONSULT ON THIS PATIENT 08/12/24. THE REFERRAL SAYS DR. MARTINES THOUGH, AND THE [facility] WAS TRYING TO SCHEDULE AS NEW PATIENT. THE AFTER VISIT SUMMARY FROM THE ED SAYS TO FOLLOW UP WITH DR. REBOLLEDO IN 1 MONTH. PLEASE CONTACT PATIENT, [facility] DOES NOT HAVE THIS AVAILABILITY.

## 2024-11-13 RX ORDER — FLUCONAZOLE 100 MG/1
1 TABLET ORAL DAILY
COMMUNITY
Start: 2024-09-19 | End: 2024-11-18

## 2024-11-13 RX ORDER — CEPHALEXIN 500 MG/1
CAPSULE ORAL
COMMUNITY
Start: 2024-09-19 | End: 2024-11-18

## 2024-11-18 ENCOUNTER — OFFICE VISIT (OUTPATIENT)
Dept: ENDOCRINOLOGY | Facility: CLINIC | Age: 49
End: 2024-11-18
Payer: MEDICAID

## 2024-11-18 VITALS
WEIGHT: 293 LBS | HEART RATE: 98 BPM | DIASTOLIC BLOOD PRESSURE: 85 MMHG | SYSTOLIC BLOOD PRESSURE: 140 MMHG | OXYGEN SATURATION: 97 % | BODY MASS INDEX: 41.02 KG/M2 | HEIGHT: 71 IN

## 2024-11-18 DIAGNOSIS — E66.01 OBESITY, CLASS III, BMI 40-49.9 (MORBID OBESITY): ICD-10-CM

## 2024-11-18 DIAGNOSIS — E03.9 HYPOTHYROIDISM (ACQUIRED): ICD-10-CM

## 2024-11-18 DIAGNOSIS — E11.65 TYPE 2 DIABETES MELLITUS WITH HYPERGLYCEMIA, WITH LONG-TERM CURRENT USE OF INSULIN: Primary | ICD-10-CM

## 2024-11-18 DIAGNOSIS — E78.2 MIXED HYPERLIPIDEMIA: ICD-10-CM

## 2024-11-18 DIAGNOSIS — Z79.4 TYPE 2 DIABETES MELLITUS WITH HYPERGLYCEMIA, WITH LONG-TERM CURRENT USE OF INSULIN: Primary | ICD-10-CM

## 2024-11-18 LAB — GLUCOSE BLDC GLUCOMTR-MCNC: 439 MG/DL (ref 70–105)

## 2024-11-18 PROCEDURE — 82948 REAGENT STRIP/BLOOD GLUCOSE: CPT | Performed by: INTERNAL MEDICINE

## 2024-11-18 PROCEDURE — 3079F DIAST BP 80-89 MM HG: CPT | Performed by: INTERNAL MEDICINE

## 2024-11-18 PROCEDURE — 3046F HEMOGLOBIN A1C LEVEL >9.0%: CPT | Performed by: INTERNAL MEDICINE

## 2024-11-18 PROCEDURE — 1159F MED LIST DOCD IN RCRD: CPT | Performed by: INTERNAL MEDICINE

## 2024-11-18 PROCEDURE — 3077F SYST BP >= 140 MM HG: CPT | Performed by: INTERNAL MEDICINE

## 2024-11-18 PROCEDURE — 99214 OFFICE O/P EST MOD 30 MIN: CPT | Performed by: INTERNAL MEDICINE

## 2024-11-18 PROCEDURE — 1160F RVW MEDS BY RX/DR IN RCRD: CPT | Performed by: INTERNAL MEDICINE

## 2024-11-18 RX ORDER — SACUBITRIL AND VALSARTAN 24; 26 MG/1; MG/1
TABLET, FILM COATED ORAL
COMMUNITY
Start: 2024-08-26

## 2024-11-18 RX ORDER — BLOOD-GLUCOSE SENSOR
EACH MISCELLANEOUS
Qty: 2 EACH | Refills: 5 | Status: SHIPPED | OUTPATIENT
Start: 2024-11-18 | End: 2024-11-21 | Stop reason: CLARIF

## 2024-11-18 RX ORDER — HUMAN INSULIN 100 [IU]/ML
INJECTION, SUSPENSION SUBCUTANEOUS
Qty: 60 ML | Refills: 6 | Status: SHIPPED | OUTPATIENT
Start: 2024-11-18

## 2024-11-18 NOTE — PROGRESS NOTES
Endocrine Progress Note Outpatient     Patient Care Team:  Oliverio Burns MD as PCP - General (Family Medicine)    Chief Complaint: Follow-up type 2 diabetes          HPI: This is a 48-year-old female here for follow-up for type 2 diabetes and hypertriglyceridemia.  Patient was seen in the hospital when she was hospitalized with high blood sugars been started on basal bolus and therapy.     For diabetes she is currently on Lantus 37 units subcu daily with aspart/NovoLog 12 units with each meal.  She is also on metformin 500 mg daily.  She checks her blood sugars once a day usually runs over 300.    For hypothyroidism: Currently on levothyroxine at 100 mcg p.o. daily.    For hyperlipidemia/hypertriglyceridemia she is on atorvastatin.      Past Medical History:   Diagnosis Date    Diabetes mellitus     Hyperlipemia     Hypertension     Sleep apnea     Thyroid disease        Social History     Socioeconomic History    Marital status: Single    Number of children: 2    Years of education: 15   Tobacco Use    Smoking status: Every Day     Current packs/day: 1.00     Types: Cigarettes   Vaping Use    Vaping status: Never Used   Substance and Sexual Activity    Alcohol use: Never    Drug use: Never    Sexual activity: Defer       Family History   Problem Relation Age of Onset    Cancer Father         kidney    Thyroid disease Father     Hypertension Father     Diabetes Father     Diabetes Maternal Grandmother     Diabetes Maternal Grandfather     Hypertension Paternal Grandmother     Diabetes Paternal Grandmother        Allergies   Allergen Reactions    Sulfa Antibiotics Rash       ROS:   Constitutional:  Admit fatigue, tiredness.    Eyes:  Denies change in visual acuity   HENT:  Denies nasal congestion or sore throat   Respiratory: denies cough, shortness of breath.   Cardiovascular:  denies chest pain, edema   GI:  Denies abdominal pain, Admit nausea, denies vomiting.   Musculoskeletal:  Denies back pain or joint  pain   Integument:  Denies dry skin and rash   Neurologic:  Denies headache, focal weakness or sensory changes   Endocrine:  Denies polyuria or polydipsia   Psychiatric:  Denies depression or anxiety      Vitals:    11/18/24 1404   BP: 140/85   Pulse: 98   SpO2: 97%     Body mass index is 44.63 kg/m².     Physical Exam:  GEN: NAD, conversant  EYES: EOMI, PERRL,  NECK: no thyromegaly,   CV: RRR  LUNG: CTA  PSYCH: Awake and coherent      Results Review:     I reviewed the patient's new clinical results.    Lab Results   Component Value Date    HGBA1C 16.30 (H) 08/12/2024    HGBA1C 11.68 (H) 08/15/2021      Lab Results   Component Value Date    GLUCOSE 380 (H) 08/12/2024    BUN 15 08/12/2024    CREATININE 0.72 08/12/2024    EGFRIFNONA 109 08/17/2021    BCR 20.8 08/12/2024    K 3.8 08/12/2024    CO2 24.0 08/12/2024    CALCIUM 9.1 08/12/2024    ALBUMIN 3.7 08/11/2024    AST 21 08/11/2024    ALT 30 08/11/2024    CHOL 219 (H) 08/12/2024    TRIG 1,483 (H) 08/12/2024    LDL  08/12/2024      Comment:      Unable to calculate    LDL 40 08/12/2024    HDL 22 (L) 08/12/2024     Lab Results   Component Value Date    TSH 1.930 08/12/2024    FREET4 0.95 08/12/2024         Medication Review: Reviewed.       Current Outpatient Medications:     Accu-Chek Softclix Lancets lancets, 1 each by Other route 3 (Three) Times a Day Before Meals. Use as instructed Dx code: E11.65, Disp: 100 each, Rfl: 2    atorvastatin (LIPITOR) 20 MG tablet, Take 1 tablet by mouth Daily., Disp: , Rfl:     Blood Glucose Monitoring Suppl (Accu-Chek Guide Me) w/Device kit, Use 1 kit 3 (Three) Times a Day Before Meals. Dx code: E11.65  NDC [address]-22  Bin#: [id number] Group #: [id number]  ID #: [id number]  Issuer #: [id number]), Disp: 1 kit, Rfl: 0    glucose blood (Accu-Chek Guide) test strip, 1 each by Other route 3 (Three) Times a Day Before Meals. Use as instructed Dx code: E11.65, Disp: 100 each, Rfl: 2    ibuprofen (ADVIL,MOTRIN) 200 MG tablet, Take 2 tablets by  mouth 3 times a day., Disp: , Rfl:     insulin aspart (NovoLOG FlexPen) 100 UNIT/ML solution pen-injector sc pen, Inject 12 Units under the skin into the appropriate area as directed 3 (Three) Times a Day With Meals. Dx code: E11.65, Disp: 15 mL, Rfl: 2    Insulin Glargine (LANTUS SOLOSTAR) 100 UNIT/ML injection pen, Inject 37 Units under the skin into the appropriate area as directed Every Night. Dx code: E11.65, Disp: 15 mL, Rfl: 2    Insulin Pen Needle (Pen Needles) 32G X 4 MM misc, Use 1 each 4 (Four) Times a Day Before Meals & at Bedtime. Dx code: E11.65, Disp: 200 each, Rfl: 2    levothyroxine (SYNTHROID, LEVOTHROID) 100 MCG tablet, Take 1 tablet by mouth Daily., Disp: , Rfl:     multivitamin (MULTI-VITAMIN PO), Take 1 tablet by mouth Daily., Disp: , Rfl:     sacubitril-valsartan (Entresto) 24-26 MG tablet, Take  by mouth., Disp: , Rfl:     metFORMIN (GLUCOPHAGE) 500 MG tablet, Take 1 tablet by mouth Daily., Disp: , Rfl:           Assessment and plan:  Diabetes mellitus type 2 with hyperglycemia: Uncontrolled with very high blood sugars.  She is not able to find the insulin Lantus and NovoLog.  Will DC both of them and add Novolin 70/30 insulin to 35 units subcu 3 times a day before meals and will follow blood sugars.  She will continue metformin.  She definitely need to monitor her blood sugars more closely so that she can adjust her insulin dose.  Prescription for freestyle teresa sensor system has been sent.    Hypertriglyceridemia/hyperlipidemia: Currently on atorvastatin, will follow lipid panel    Hypothyroidism: Currently on levothyroxine and follow labs.    Class III obesity: BMI 44.7: Recommend to work on diet and activity for           Kali Bucio MD FACE.

## 2024-11-18 NOTE — PATIENT INSTRUCTIONS
Please stop smoking  DC both Lantus and NovoLog  Start Novolin 70/30 insulin at 35 units subcu 3 times a day before each meal  Continue rest of the medication  Labs fasting before follow-up  Start using freestyle teresa sensor system to monitor your blood sugars and adjust the insulin dose

## 2024-11-19 ENCOUNTER — PATIENT ROUNDING (BHMG ONLY) (OUTPATIENT)
Dept: ENDOCRINOLOGY | Facility: CLINIC | Age: 49
End: 2024-11-19
Payer: MEDICAID

## 2024-11-19 NOTE — PROGRESS NOTES
November 19, 2024    Hello, may I speak with Annie Myers?    My name is galina      I am  with [facility]  [address] [place] [address].    Before we get started may I verify your date of birth? 1975    I am calling to officially welcome you to our practice and ask about your recent visit. Is this a good time to talk? yes    Tell me about your visit with us. What things went well?  everything       We're always looking for ways to make our patients' experiences even better. Do you have recommendations on ways we may improve?  no    Overall were you satisfied with your first visit to our practice? yes       I appreciate you taking the time to speak with me today. Is there anything else I can do for you? no      Thank you, and have a great day.

## 2024-11-21 DIAGNOSIS — Z79.4 TYPE 2 DIABETES MELLITUS WITH HYPERGLYCEMIA, WITH LONG-TERM CURRENT USE OF INSULIN: Primary | ICD-10-CM

## 2024-11-21 DIAGNOSIS — E11.65 TYPE 2 DIABETES MELLITUS WITH HYPERGLYCEMIA, WITH LONG-TERM CURRENT USE OF INSULIN: Primary | ICD-10-CM

## 2024-11-21 RX ORDER — PROCHLORPERAZINE 25 MG/1
1 SUPPOSITORY RECTAL
Qty: 1 EACH | Refills: 4 | Status: SHIPPED | OUTPATIENT
Start: 2024-11-21

## 2024-11-21 RX ORDER — PROCHLORPERAZINE 25 MG/1
1 SUPPOSITORY RECTAL DAILY
Qty: 1 EACH | Refills: 0 | Status: SHIPPED | OUTPATIENT
Start: 2024-11-21

## 2024-11-21 RX ORDER — PROCHLORPERAZINE 25 MG/1
SUPPOSITORY RECTAL
Qty: 9 EACH | Refills: 4 | Status: SHIPPED | OUTPATIENT
Start: 2024-11-21

## 2025-01-16 RX ORDER — PEN NEEDLE, DIABETIC 30 GX3/16"
1 NEEDLE, DISPOSABLE MISCELLANEOUS
Qty: 200 EACH | Refills: 2 | Status: SHIPPED | OUTPATIENT
Start: 2025-01-16